# Patient Record
Sex: FEMALE | Race: ASIAN | URBAN - METROPOLITAN AREA
[De-identification: names, ages, dates, MRNs, and addresses within clinical notes are randomized per-mention and may not be internally consistent; named-entity substitution may affect disease eponyms.]

---

## 2017-02-16 ENCOUNTER — EMERGENCY (EMERGENCY)
Facility: HOSPITAL | Age: 42
LOS: 0 days | Discharge: HOME | End: 2017-02-16
Attending: EMERGENCY MEDICINE
Payer: MEDICAID

## 2017-02-16 VITALS
HEIGHT: 57 IN | WEIGHT: 175.93 LBS | OXYGEN SATURATION: 99 % | DIASTOLIC BLOOD PRESSURE: 86 MMHG | HEART RATE: 81 BPM | SYSTOLIC BLOOD PRESSURE: 130 MMHG | RESPIRATION RATE: 20 BRPM | TEMPERATURE: 98 F

## 2017-02-16 DIAGNOSIS — I10 ESSENTIAL (PRIMARY) HYPERTENSION: ICD-10-CM

## 2017-02-16 DIAGNOSIS — M54.9 DORSALGIA, UNSPECIFIED: ICD-10-CM

## 2017-02-16 DIAGNOSIS — N20.0 CALCULUS OF KIDNEY: ICD-10-CM

## 2017-02-16 DIAGNOSIS — Z98.89 OTHER SPECIFIED POSTPROCEDURAL STATES: Chronic | ICD-10-CM

## 2017-02-16 LAB
ALBUMIN SERPL ELPH-MCNC: 3.5 G/DL — SIGNIFICANT CHANGE UP (ref 3.3–5)
ALP SERPL-CCNC: 87 U/L — SIGNIFICANT CHANGE UP (ref 40–120)
ALT FLD-CCNC: 29 U/L — SIGNIFICANT CHANGE UP (ref 12–78)
ANION GAP SERPL CALC-SCNC: 8 MMOL/L — SIGNIFICANT CHANGE UP (ref 5–17)
APPEARANCE UR: CLEAR — SIGNIFICANT CHANGE UP
AST SERPL-CCNC: 24 U/L — SIGNIFICANT CHANGE UP (ref 15–37)
BASOPHILS # BLD AUTO: 0.1 K/UL — SIGNIFICANT CHANGE UP (ref 0–0.2)
BASOPHILS NFR BLD AUTO: 1 % — SIGNIFICANT CHANGE UP (ref 0–2)
BILIRUB SERPL-MCNC: 0.2 MG/DL — SIGNIFICANT CHANGE UP (ref 0.2–1.2)
BILIRUB UR-MCNC: NEGATIVE — SIGNIFICANT CHANGE UP
BUN SERPL-MCNC: 18 MG/DL — SIGNIFICANT CHANGE UP (ref 7–23)
CALCIUM SERPL-MCNC: 8.2 MG/DL — LOW (ref 8.5–10.1)
CHLORIDE SERPL-SCNC: 107 MMOL/L — SIGNIFICANT CHANGE UP (ref 96–108)
CO2 SERPL-SCNC: 26 MMOL/L — SIGNIFICANT CHANGE UP (ref 22–31)
COLOR SPEC: YELLOW — SIGNIFICANT CHANGE UP
CREAT SERPL-MCNC: 0.72 MG/DL — SIGNIFICANT CHANGE UP (ref 0.5–1.3)
DIFF PNL FLD: ABNORMAL
EOSINOPHIL # BLD AUTO: 0.1 K/UL — SIGNIFICANT CHANGE UP (ref 0–0.5)
EOSINOPHIL NFR BLD AUTO: 1.4 % — SIGNIFICANT CHANGE UP (ref 0–6)
EPI CELLS # UR: SIGNIFICANT CHANGE UP
GLUCOSE SERPL-MCNC: 94 MG/DL — SIGNIFICANT CHANGE UP (ref 70–99)
GLUCOSE UR QL: NEGATIVE MG/DL — SIGNIFICANT CHANGE UP
HCG UR QL: NEGATIVE — SIGNIFICANT CHANGE UP
HCT VFR BLD CALC: 39.3 % — SIGNIFICANT CHANGE UP (ref 34.5–45)
HGB BLD-MCNC: 13.2 G/DL — SIGNIFICANT CHANGE UP (ref 11.5–15.5)
KETONES UR-MCNC: NEGATIVE — SIGNIFICANT CHANGE UP
LACTATE SERPL-SCNC: 1.1 MMOL/L — SIGNIFICANT CHANGE UP (ref 0.7–2)
LEUKOCYTE ESTERASE UR-ACNC: ABNORMAL
LYMPHOCYTES # BLD AUTO: 3.5 K/UL — HIGH (ref 1–3.3)
LYMPHOCYTES # BLD AUTO: 40 % — SIGNIFICANT CHANGE UP (ref 13–44)
MCHC RBC-ENTMCNC: 27.7 PG — SIGNIFICANT CHANGE UP (ref 27–34)
MCHC RBC-ENTMCNC: 33.7 GM/DL — SIGNIFICANT CHANGE UP (ref 32–36)
MCV RBC AUTO: 82.2 FL — SIGNIFICANT CHANGE UP (ref 80–100)
MONOCYTES # BLD AUTO: 0.5 K/UL — SIGNIFICANT CHANGE UP (ref 0–0.9)
MONOCYTES NFR BLD AUTO: 5.8 % — SIGNIFICANT CHANGE UP (ref 2–14)
NEUTROPHILS # BLD AUTO: 4.6 K/UL — SIGNIFICANT CHANGE UP (ref 1.8–7.4)
NEUTROPHILS NFR BLD AUTO: 51.8 % — SIGNIFICANT CHANGE UP (ref 43–77)
NITRITE UR-MCNC: NEGATIVE — SIGNIFICANT CHANGE UP
PH UR: 8 — SIGNIFICANT CHANGE UP (ref 4.8–8)
PLATELET # BLD AUTO: 279 K/UL — SIGNIFICANT CHANGE UP (ref 150–400)
POTASSIUM SERPL-MCNC: 4.1 MMOL/L — SIGNIFICANT CHANGE UP (ref 3.5–5.3)
POTASSIUM SERPL-SCNC: 4.1 MMOL/L — SIGNIFICANT CHANGE UP (ref 3.5–5.3)
PROT SERPL-MCNC: 7.7 GM/DL — SIGNIFICANT CHANGE UP (ref 6–8.3)
PROT UR-MCNC: NEGATIVE MG/DL — SIGNIFICANT CHANGE UP
RBC # BLD: 4.78 M/UL — SIGNIFICANT CHANGE UP (ref 3.8–5.2)
RBC # FLD: 12.8 % — SIGNIFICANT CHANGE UP (ref 11–15)
RBC CASTS # UR COMP ASSIST: SIGNIFICANT CHANGE UP /HPF (ref 0–4)
SODIUM SERPL-SCNC: 141 MMOL/L — SIGNIFICANT CHANGE UP (ref 135–145)
SP GR SPEC: 1.01 — SIGNIFICANT CHANGE UP (ref 1.01–1.02)
UROBILINOGEN FLD QL: NEGATIVE MG/DL — SIGNIFICANT CHANGE UP
WBC # BLD: 8.8 K/UL — SIGNIFICANT CHANGE UP (ref 3.8–10.5)
WBC # FLD AUTO: 8.8 K/UL — SIGNIFICANT CHANGE UP (ref 3.8–10.5)
WBC UR QL: SIGNIFICANT CHANGE UP

## 2017-02-16 PROCEDURE — 74176 CT ABD & PELVIS W/O CONTRAST: CPT | Mod: 26

## 2017-02-16 PROCEDURE — 99285 EMERGENCY DEPT VISIT HI MDM: CPT

## 2017-02-16 RX ORDER — CYCLOBENZAPRINE HYDROCHLORIDE 10 MG/1
10 TABLET, FILM COATED ORAL ONCE
Qty: 0 | Refills: 0 | Status: COMPLETED | OUTPATIENT
Start: 2017-02-16 | End: 2017-02-16

## 2017-02-16 RX ORDER — KETOROLAC TROMETHAMINE 30 MG/ML
60 SYRINGE (ML) INJECTION ONCE
Qty: 0 | Refills: 0 | Status: DISCONTINUED | OUTPATIENT
Start: 2017-02-16 | End: 2017-02-16

## 2017-02-16 RX ORDER — CEFTRIAXONE 500 MG/1
2 INJECTION, POWDER, FOR SOLUTION INTRAMUSCULAR; INTRAVENOUS ONCE
Qty: 0 | Refills: 0 | Status: COMPLETED | OUTPATIENT
Start: 2017-02-16 | End: 2017-02-16

## 2017-02-16 RX ORDER — SODIUM CHLORIDE 9 MG/ML
1000 INJECTION INTRAMUSCULAR; INTRAVENOUS; SUBCUTANEOUS ONCE
Qty: 0 | Refills: 0 | Status: COMPLETED | OUTPATIENT
Start: 2017-02-16 | End: 2017-02-16

## 2017-02-16 RX ADMIN — Medication 60 MILLIGRAM(S): at 19:21

## 2017-02-16 RX ADMIN — Medication 60 MILLIGRAM(S): at 21:26

## 2017-02-16 RX ADMIN — SODIUM CHLORIDE 1000 MILLILITER(S): 9 INJECTION INTRAMUSCULAR; INTRAVENOUS; SUBCUTANEOUS at 20:11

## 2017-02-16 RX ADMIN — CEFTRIAXONE 100 GRAM(S): 500 INJECTION, POWDER, FOR SOLUTION INTRAMUSCULAR; INTRAVENOUS at 20:20

## 2017-02-16 RX ADMIN — CYCLOBENZAPRINE HYDROCHLORIDE 10 MILLIGRAM(S): 10 TABLET, FILM COATED ORAL at 19:21

## 2017-02-16 NOTE — ED PROVIDER NOTE - OBJECTIVE STATEMENT
41yo female with pmh HTN, presents with bilateral lower flank and lower back since yesterday making it difficulty to walk. No exacerbating factor. No hematuria, dysuria. No analgesia taken.     No fever/chills. No photophobia/eye pain/changes in vision, No ear pain/sore throat/dysphagia, No chest pain/palpitations. No SOB/cough/stridor. No abdominal pain, N/V/D, no black/bloody bm. No dysuria/frequency, No headache. No Dizziness.  No rash.  No numbness/tingling/weakness.

## 2017-02-16 NOTE — ED PROVIDER NOTE - MEDICAL DECISION MAKING DETAILS
Lab values do not require emergent intervention. UA with no infxn. CT with staghorn calculi. fu with dr. goldsmith. dc with nsaids, flexeril. Discussed results and outcome of testing with the patient.  Patient given copy of available results. Patient advised to please follow up with their PMD within the next 24 hours and return to the Emergency Department for worsening symptoms or any other concerns.

## 2017-02-16 NOTE — ED ADULT NURSE NOTE - OBJECTIVE STATEMENT
received ft c/o back pain since last night denies known injury or recent trauma ambulatory without difficulty noted

## 2017-02-16 NOTE — ED PROVIDER NOTE - PROGRESS NOTE DETAILS
d/w dr. goldsmith, reviewed films, states to dc, will see pt in office. no abx needed unless febrile or wcc.

## 2017-02-17 LAB
CULTURE RESULTS: SIGNIFICANT CHANGE UP
SPECIMEN SOURCE: SIGNIFICANT CHANGE UP

## 2017-02-17 RX ORDER — CYCLOBENZAPRINE HYDROCHLORIDE 10 MG/1
1 TABLET, FILM COATED ORAL
Qty: 10 | Refills: 0 | OUTPATIENT
Start: 2017-02-17

## 2017-02-17 RX ORDER — IBUPROFEN 200 MG
1 TABLET ORAL
Qty: 12 | Refills: 1 | OUTPATIENT
Start: 2017-02-17

## 2017-02-22 LAB
CULTURE RESULTS: SIGNIFICANT CHANGE UP
CULTURE RESULTS: SIGNIFICANT CHANGE UP
SPECIMEN SOURCE: SIGNIFICANT CHANGE UP
SPECIMEN SOURCE: SIGNIFICANT CHANGE UP

## 2018-02-09 ENCOUNTER — EMERGENCY (EMERGENCY)
Facility: HOSPITAL | Age: 43
LOS: 0 days | Discharge: HOME | End: 2018-02-09
Attending: EMERGENCY MEDICINE
Payer: SELF-PAY

## 2018-02-09 VITALS
SYSTOLIC BLOOD PRESSURE: 143 MMHG | WEIGHT: 179.9 LBS | DIASTOLIC BLOOD PRESSURE: 94 MMHG | RESPIRATION RATE: 19 BRPM | TEMPERATURE: 98 F | HEIGHT: 57 IN | OXYGEN SATURATION: 97 % | HEART RATE: 91 BPM

## 2018-02-09 VITALS
DIASTOLIC BLOOD PRESSURE: 90 MMHG | SYSTOLIC BLOOD PRESSURE: 142 MMHG | TEMPERATURE: 98 F | OXYGEN SATURATION: 96 % | HEART RATE: 92 BPM | RESPIRATION RATE: 20 BRPM

## 2018-02-09 DIAGNOSIS — K29.70 GASTRITIS, UNSPECIFIED, WITHOUT BLEEDING: ICD-10-CM

## 2018-02-09 DIAGNOSIS — K21.9 GASTRO-ESOPHAGEAL REFLUX DISEASE WITHOUT ESOPHAGITIS: ICD-10-CM

## 2018-02-09 DIAGNOSIS — R07.9 CHEST PAIN, UNSPECIFIED: ICD-10-CM

## 2018-02-09 DIAGNOSIS — Z98.89 OTHER SPECIFIED POSTPROCEDURAL STATES: Chronic | ICD-10-CM

## 2018-02-09 DIAGNOSIS — R07.81 PLEURODYNIA: ICD-10-CM

## 2018-02-09 DIAGNOSIS — R10.13 EPIGASTRIC PAIN: ICD-10-CM

## 2018-02-09 DIAGNOSIS — Z79.1 LONG TERM (CURRENT) USE OF NON-STEROIDAL ANTI-INFLAMMATORIES (NSAID): ICD-10-CM

## 2018-02-09 LAB
ALBUMIN SERPL ELPH-MCNC: 3.6 G/DL — SIGNIFICANT CHANGE UP (ref 3.3–5)
ALP SERPL-CCNC: 89 U/L — SIGNIFICANT CHANGE UP (ref 40–120)
ALT FLD-CCNC: 47 U/L — SIGNIFICANT CHANGE UP (ref 12–78)
ANION GAP SERPL CALC-SCNC: 6 MMOL/L — SIGNIFICANT CHANGE UP (ref 5–17)
APTT BLD: 29.1 SEC — SIGNIFICANT CHANGE UP (ref 27.5–37.4)
AST SERPL-CCNC: 47 U/L — HIGH (ref 15–37)
BASOPHILS # BLD AUTO: 0.04 K/UL — SIGNIFICANT CHANGE UP (ref 0–0.2)
BASOPHILS NFR BLD AUTO: 0.4 % — SIGNIFICANT CHANGE UP (ref 0–2)
BILIRUB SERPL-MCNC: 0.5 MG/DL — SIGNIFICANT CHANGE UP (ref 0.2–1.2)
BUN SERPL-MCNC: 11 MG/DL — SIGNIFICANT CHANGE UP (ref 7–23)
CALCIUM SERPL-MCNC: 8 MG/DL — LOW (ref 8.5–10.1)
CHLORIDE SERPL-SCNC: 107 MMOL/L — SIGNIFICANT CHANGE UP (ref 96–108)
CK MB BLD-MCNC: 1.2 % — SIGNIFICANT CHANGE UP (ref 0–3.5)
CK MB CFR SERPL CALC: 2.8 NG/ML — SIGNIFICANT CHANGE UP (ref 0.5–3.6)
CK SERPL-CCNC: 243 U/L — HIGH (ref 26–192)
CO2 SERPL-SCNC: 27 MMOL/L — SIGNIFICANT CHANGE UP (ref 22–31)
CREAT SERPL-MCNC: 0.76 MG/DL — SIGNIFICANT CHANGE UP (ref 0.5–1.3)
D DIMER BLD IA.RAPID-MCNC: 221 NG/ML DDU — SIGNIFICANT CHANGE UP
EOSINOPHIL # BLD AUTO: 0.25 K/UL — SIGNIFICANT CHANGE UP (ref 0–0.5)
EOSINOPHIL NFR BLD AUTO: 2.5 % — SIGNIFICANT CHANGE UP (ref 0–6)
GLUCOSE SERPL-MCNC: 97 MG/DL — SIGNIFICANT CHANGE UP (ref 70–99)
HCG SERPL-ACNC: <1 MIU/ML — SIGNIFICANT CHANGE UP
HCT VFR BLD CALC: 38.4 % — SIGNIFICANT CHANGE UP (ref 34.5–45)
HGB BLD-MCNC: 12.3 G/DL — SIGNIFICANT CHANGE UP (ref 11.5–15.5)
IMM GRANULOCYTES NFR BLD AUTO: 0.2 % — SIGNIFICANT CHANGE UP (ref 0–1.5)
INR BLD: 1.04 RATIO — SIGNIFICANT CHANGE UP (ref 0.88–1.16)
LIDOCAIN IGE QN: 158 U/L — SIGNIFICANT CHANGE UP (ref 73–393)
LYMPHOCYTES # BLD AUTO: 2.31 K/UL — SIGNIFICANT CHANGE UP (ref 1–3.3)
LYMPHOCYTES # BLD AUTO: 23.2 % — SIGNIFICANT CHANGE UP (ref 13–44)
MCHC RBC-ENTMCNC: 26.1 PG — LOW (ref 27–34)
MCHC RBC-ENTMCNC: 32 GM/DL — SIGNIFICANT CHANGE UP (ref 32–36)
MCV RBC AUTO: 81.5 FL — SIGNIFICANT CHANGE UP (ref 80–100)
MONOCYTES # BLD AUTO: 0.66 K/UL — SIGNIFICANT CHANGE UP (ref 0–0.9)
MONOCYTES NFR BLD AUTO: 6.6 % — SIGNIFICANT CHANGE UP (ref 2–14)
NEUTROPHILS # BLD AUTO: 6.68 K/UL — SIGNIFICANT CHANGE UP (ref 1.8–7.4)
NEUTROPHILS NFR BLD AUTO: 67.1 % — SIGNIFICANT CHANGE UP (ref 43–77)
NRBC # BLD: 0 /100 WBCS — SIGNIFICANT CHANGE UP (ref 0–0)
NT-PROBNP SERPL-SCNC: 19 PG/ML — SIGNIFICANT CHANGE UP (ref 0–125)
PLATELET # BLD AUTO: 266 K/UL — SIGNIFICANT CHANGE UP (ref 150–400)
POTASSIUM SERPL-MCNC: 4.1 MMOL/L — SIGNIFICANT CHANGE UP (ref 3.5–5.3)
POTASSIUM SERPL-SCNC: 4.1 MMOL/L — SIGNIFICANT CHANGE UP (ref 3.5–5.3)
PROT SERPL-MCNC: 7.7 GM/DL — SIGNIFICANT CHANGE UP (ref 6–8.3)
PROTHROM AB SERPL-ACNC: 11.4 SEC — SIGNIFICANT CHANGE UP (ref 9.8–12.7)
RBC # BLD: 4.71 M/UL — SIGNIFICANT CHANGE UP (ref 3.8–5.2)
RBC # FLD: 13.8 % — SIGNIFICANT CHANGE UP (ref 10.3–14.5)
SODIUM SERPL-SCNC: 140 MMOL/L — SIGNIFICANT CHANGE UP (ref 135–145)
TROPONIN I SERPL-MCNC: <.015 NG/ML — SIGNIFICANT CHANGE UP (ref 0.01–0.04)
WBC # BLD: 9.96 K/UL — SIGNIFICANT CHANGE UP (ref 3.8–10.5)
WBC # FLD AUTO: 9.96 K/UL — SIGNIFICANT CHANGE UP (ref 3.8–10.5)

## 2018-02-09 PROCEDURE — 71275 CT ANGIOGRAPHY CHEST: CPT | Mod: 26

## 2018-02-09 PROCEDURE — 71045 X-RAY EXAM CHEST 1 VIEW: CPT | Mod: 26

## 2018-02-09 PROCEDURE — 99285 EMERGENCY DEPT VISIT HI MDM: CPT

## 2018-02-09 RX ORDER — SODIUM CHLORIDE 9 MG/ML
3 INJECTION INTRAMUSCULAR; INTRAVENOUS; SUBCUTANEOUS ONCE
Qty: 0 | Refills: 0 | Status: COMPLETED | OUTPATIENT
Start: 2018-02-09 | End: 2018-02-09

## 2018-02-09 RX ORDER — FAMOTIDINE 10 MG/ML
1 INJECTION INTRAVENOUS
Qty: 60 | Refills: 0 | OUTPATIENT
Start: 2018-02-09 | End: 2018-03-10

## 2018-02-09 RX ORDER — FAMOTIDINE 10 MG/ML
20 INJECTION INTRAVENOUS ONCE
Qty: 0 | Refills: 0 | Status: COMPLETED | OUTPATIENT
Start: 2018-02-09 | End: 2018-02-09

## 2018-02-09 RX ORDER — ASPIRIN/CALCIUM CARB/MAGNESIUM 324 MG
325 TABLET ORAL ONCE
Qty: 0 | Refills: 0 | Status: COMPLETED | OUTPATIENT
Start: 2018-02-09 | End: 2018-02-09

## 2018-02-09 RX ADMIN — SODIUM CHLORIDE 3 MILLILITER(S): 9 INJECTION INTRAMUSCULAR; INTRAVENOUS; SUBCUTANEOUS at 09:42

## 2018-02-09 RX ADMIN — Medication 325 MILLIGRAM(S): at 10:53

## 2018-02-09 RX ADMIN — FAMOTIDINE 20 MILLIGRAM(S): 10 INJECTION INTRAVENOUS at 09:43

## 2018-02-09 RX ADMIN — Medication 30 MILLILITER(S): at 09:43

## 2018-02-09 NOTE — ED ADULT NURSE REASSESSMENT NOTE - NS ED NURSE REASSESS COMMENT FT1
pt seen and re-evalauted by ED attending d/c ready in stable condition left ED ambulatory in no acute distress. instructed to f/u with PMD for f/u care and take meds as prescribed

## 2018-02-09 NOTE — ED ADULT NURSE NOTE - MODE OF DISCHARGE
no fever/no stiffness/no bruising/no weakness/no back pain/no abrasion/no numbness/no difficulty bearing weight/no deformity/no tingling
Ambulatory

## 2018-02-09 NOTE — ED PROVIDER NOTE - OBJECTIVE STATEMENT
44yo female with pmh HTN presents with 1 day of constant epigastric pain radiating throughout chest and back with Left pleuritc pain. no sob, palpitations. Denies recent immobilization, surgery, long trips or plane rides, hormones/OCP, leg pain or swelling or family or personal history of blood clotting disorder. nonsmoker, no drug use, no famh of cardiac dz. 42yo female with pmh HTN presents with 1 day of constant epigastric pain radiating throughout chest and back with Left pleuritc pain. no sob, palpitations. Denies recent immobilization, surgery, long trips or plane rides, hormones/OCP, leg pain or swelling or family or personal history of blood clotting disorder. nonsmoker, no drug use, no famh of cardiac dz.     ROS: No fever/chills. No photophobia/eye pain/changes in vision, No ear pain/sore throat/dysphagia, + chest pain, no palpitations. No SOB/cough/stridor. +abdominal pain, no N/V/D, no black/bloody bm. No dysuria/frequency/discharge, No headache. No Dizziness.  No rash.  No numbness/tingling/weakness.

## 2018-02-09 NOTE — ED PROVIDER NOTE - MEDICAL DECISION MAKING DETAILS
Pt well appearing, symptoms resolved with GI cocktail. LIkely gastrititis, dc with pepcid and fu with pmd. Discussed results and outcome of testing with the patient.  Patient given copy of available results. Patient advised to please follow up with their PMD within the next 24 hours and return to the Emergency Department for worsening symptoms or any other concerns.

## 2018-02-09 NOTE — ED ADULT TRIAGE NOTE - CHIEF COMPLAINT QUOTE
patient c/o of chest pain radiating in the back and R side , started 1 day ago , denied N/V denied dizziness no weakness c/o of headache , patient denied cough , no fever

## 2018-02-09 NOTE — ED PROVIDER NOTE - PHYSICAL EXAMINATION
Gen: Alert, Well appearing. NAD    Head: NC, AT, PERRL, EOMI, normal lids/conjunctiva   ENT: Bilateral TM WNL, normal hearing, patent oropharynx without erythema/exudate, uvula midline  Neck: supple, no tenderness/meningismus/JVD   Pulm: Bilateral clear BS, normal resp effort, no wheeze/stridor/retractions  CV: RRR, no M/R/G, +dist pulses   Abd: soft, NT/ND, +BS, no guarding/rebound tenderness  Mskel: no edema/erythema/cyanosis   Skin: no rash   Neuro: AAOx3, no sensory/motor deficits, Gen: Alert, Well appearing. NAD    Head: NC, AT, PERRL, EOMI, normal lids/conjunctiva   ENT: Bilateral TM WNL, normal hearing, patent oropharynx without erythema/exudate, uvula midline  Neck: supple, no tenderness/meningismus/JVD   Pulm: Bilateral clear BS, normal resp effort, no wheeze/stridor/retractions  CV: RRR, no M/R/G, +dist pulses   Abd: soft, ++ epigastric tenderness, no D, +BS, no guarding/rebound tenderness  Mskel: no edema/erythema/cyanosis   Skin: no rash   Neuro: AAOx3, no sensory/motor deficits,

## 2020-11-17 ENCOUNTER — APPOINTMENT (OUTPATIENT)
Dept: INTERNAL MEDICINE | Facility: CLINIC | Age: 45
End: 2020-11-17
Payer: COMMERCIAL

## 2020-11-17 VITALS
OXYGEN SATURATION: 98 % | DIASTOLIC BLOOD PRESSURE: 88 MMHG | WEIGHT: 166 LBS | BODY MASS INDEX: 32.17 KG/M2 | SYSTOLIC BLOOD PRESSURE: 130 MMHG | TEMPERATURE: 97.4 F | HEIGHT: 60.05 IN | HEART RATE: 94 BPM

## 2020-11-17 VITALS — DIASTOLIC BLOOD PRESSURE: 90 MMHG | SYSTOLIC BLOOD PRESSURE: 146 MMHG

## 2020-11-17 DIAGNOSIS — Z81.1 FAMILY HISTORY OF ALCOHOL ABUSE AND DEPENDENCE: ICD-10-CM

## 2020-11-17 DIAGNOSIS — G47.00 INSOMNIA, UNSPECIFIED: ICD-10-CM

## 2020-11-17 DIAGNOSIS — Z87.42 PERSONAL HISTORY OF OTHER DISEASES OF THE FEMALE GENITAL TRACT: ICD-10-CM

## 2020-11-17 DIAGNOSIS — Z00.00 ENCOUNTER FOR GENERAL ADULT MEDICAL EXAMINATION W/OUT ABNORMAL FINDINGS: ICD-10-CM

## 2020-11-17 DIAGNOSIS — E28.2 POLYCYSTIC OVARIAN SYNDROME: ICD-10-CM

## 2020-11-17 DIAGNOSIS — R25.1 TREMOR, UNSPECIFIED: ICD-10-CM

## 2020-11-17 DIAGNOSIS — K21.9 GASTRO-ESOPHAGEAL REFLUX DISEASE W/OUT ESOPHAGITIS: ICD-10-CM

## 2020-11-17 DIAGNOSIS — R07.89 OTHER CHEST PAIN: ICD-10-CM

## 2020-11-17 DIAGNOSIS — M25.561 PAIN IN RIGHT KNEE: ICD-10-CM

## 2020-11-17 DIAGNOSIS — R06.00 DYSPNEA, UNSPECIFIED: ICD-10-CM

## 2020-11-17 PROCEDURE — 99203 OFFICE O/P NEW LOW 30 MIN: CPT | Mod: 25

## 2020-11-17 PROCEDURE — 99072 ADDL SUPL MATRL&STAF TM PHE: CPT

## 2020-11-17 PROCEDURE — 36415 COLL VENOUS BLD VENIPUNCTURE: CPT

## 2020-11-17 NOTE — HEALTH RISK ASSESSMENT
[# Of Children ___] : has [unfilled] children [No] : No [0] : 2) Feeling down, depressed, or hopeless: Not at all (0) [FreeTextEntry2] :  at [employer] [FreeTextEntry3] :  [] : No [[id number]] : 0

## 2020-11-17 NOTE — HISTORY OF PRESENT ILLNESS
[FreeTextEntry8] : 44 y/o female with h/o hypertension who is here to establish care. She just moved back to [place] from [place] 4 months ago because her daughter is pregnant. She ronnell lisinopril/HCTZ 10/12.5. Some days she forgets to take her medication in the morning. She doesn’t take it later in the day if she forgets. She works at [employer]. she checks her BP there about once per month but shes not sure what kinds of reading she gets.  Also has h/o borderline DM. has lost weight with improved diet, exercise \par She c/o dry skin since she returned to [place]\par She c/o right knee pain for several months. Hurts when she kneels on it\par She has difficulty falling asleep. She has been taking 2 benadryl every night for years. She is not groggy in the morning. She takes it at 7pm as it takes several hours to work. \par pap overdue\par mammo overdue\par colonoscopy 2019

## 2020-11-17 NOTE — PLAN
[FreeTextEntry1] : BP is elevated. Will have her f/u in 1 month to recheck. If still elevated will adjust medication\par Check CBC and CMP\par Continue exercise and healthy diet\par Recommended flu shot and Tdap  given that her daughter is pregnant. She declines all vaccinations. \par Referral to GYN  and dermatology\par Xray of right knee\par Given Mammogram Rx\par

## 2020-11-17 NOTE — PHYSICAL EXAM
[No Acute Distress] : no acute distress [Well Nourished] : well nourished [Normal Sclera/Conjunctiva] : normal sclera/conjunctiva [Normal Outer Ear/Nose] : the outer ears and nose were normal in appearance [Supple] : supple [No Respiratory Distress] : no respiratory distress  [No Accessory Muscle Use] : no accessory muscle use [Clear to Auscultation] : lungs were clear to auscultation bilaterally [Normal Rate] : normal rate  [Regular Rhythm] : with a regular rhythm [Normal S1, S2] : normal S1 and S2 [No Edema] : there was no peripheral edema [Soft] : abdomen soft [Non Tender] : non-tender [Non-distended] : non-distended [Normal Bowel Sounds] : normal bowel sounds [Normal Gait] : normal gait [Normal Affect] : the affect was normal [Normal Insight/Judgement] : insight and judgment were intact [de-identified] : dry skin

## 2020-11-24 LAB
ALBUMIN SERPL ELPH-MCNC: 4.6 G/DL
ALP BLD-CCNC: 87 U/L
ALT SERPL-CCNC: 15 U/L
ANION GAP SERPL CALC-SCNC: 12 MMOL/L
AST SERPL-CCNC: 20 U/L
BASOPHILS # BLD AUTO: 0.04 K/UL
BASOPHILS NFR BLD AUTO: 0.6 %
BILIRUB SERPL-MCNC: 0.2 MG/DL
BUN SERPL-MCNC: 14 MG/DL
CALCIUM SERPL-MCNC: 9.2 MG/DL
CHLORIDE SERPL-SCNC: 101 MMOL/L
CO2 SERPL-SCNC: 25 MMOL/L
CREAT SERPL-MCNC: 1.06 MG/DL
EOSINOPHIL # BLD AUTO: 0.1 K/UL
EOSINOPHIL NFR BLD AUTO: 1.5 %
ESTIMATED AVERAGE GLUCOSE: 123 MG/DL
GLUCOSE SERPL-MCNC: 104 MG/DL
HBA1C MFR BLD HPLC: 5.9 %
HCT VFR BLD CALC: 43.7 %
HGB BLD-MCNC: 13.5 G/DL
IMM GRANULOCYTES NFR BLD AUTO: 0.5 %
LYMPHOCYTES # BLD AUTO: 2.01 K/UL
LYMPHOCYTES NFR BLD AUTO: 31.1 %
MAN DIFF?: NORMAL
MCHC RBC-ENTMCNC: 28.4 PG
MCHC RBC-ENTMCNC: 30.9 GM/DL
MCV RBC AUTO: 91.8 FL
MONOCYTES # BLD AUTO: 0.41 K/UL
MONOCYTES NFR BLD AUTO: 6.3 %
NEUTROPHILS # BLD AUTO: 3.88 K/UL
NEUTROPHILS NFR BLD AUTO: 60 %
PLATELET # BLD AUTO: 303 K/UL
POTASSIUM SERPL-SCNC: 4.6 MMOL/L
PROT SERPL-MCNC: 7.6 G/DL
RBC # BLD: 4.76 M/UL
RBC # FLD: 13.1 %
SODIUM SERPL-SCNC: 138 MMOL/L
WBC # FLD AUTO: 6.47 K/UL

## 2021-01-05 ENCOUNTER — APPOINTMENT (OUTPATIENT)
Dept: INTERNAL MEDICINE | Facility: CLINIC | Age: 46
End: 2021-01-05

## 2021-01-06 NOTE — ED ADULT TRIAGE NOTE - NS ED NOTE AC HIGH RISK COUNTRIES
Dr. Charlette Noe notified about patient BP being 80s/40s. Orders for stat bolus of 25g of albumin. No

## 2021-06-17 ENCOUNTER — APPOINTMENT (OUTPATIENT)
Dept: INTERNAL MEDICINE | Facility: CLINIC | Age: 46
End: 2021-06-17
Payer: COMMERCIAL

## 2021-06-17 VITALS
HEIGHT: 60 IN | TEMPERATURE: 97.5 F | WEIGHT: 167 LBS | DIASTOLIC BLOOD PRESSURE: 100 MMHG | BODY MASS INDEX: 32.79 KG/M2 | HEART RATE: 121 BPM | SYSTOLIC BLOOD PRESSURE: 120 MMHG | OXYGEN SATURATION: 95 %

## 2021-06-17 VITALS — DIASTOLIC BLOOD PRESSURE: 82 MMHG | SYSTOLIC BLOOD PRESSURE: 130 MMHG

## 2021-06-17 DIAGNOSIS — I10 ESSENTIAL (PRIMARY) HYPERTENSION: ICD-10-CM

## 2021-06-17 DIAGNOSIS — R73.03 PREDIABETES.: ICD-10-CM

## 2021-06-17 DIAGNOSIS — Z12.31 ENCOUNTER FOR SCREENING MAMMOGRAM FOR MALIGNANT NEOPLASM OF BREAST: ICD-10-CM

## 2021-06-17 PROCEDURE — 99214 OFFICE O/P EST MOD 30 MIN: CPT | Mod: 25

## 2021-06-17 PROCEDURE — 99072 ADDL SUPL MATRL&STAF TM PHE: CPT

## 2021-06-17 PROCEDURE — 36415 COLL VENOUS BLD VENIPUNCTURE: CPT

## 2021-06-17 NOTE — PLAN
[FreeTextEntry1] : Depression screening negative \par BP is ok on lisinopril/HCTZ\par Continue to work on improving diet and exercising\par check CMP and HbA1c\par Rx for mammogram\par schedule pap\par f/u 6 months

## 2021-06-17 NOTE — HISTORY OF PRESENT ILLNESS
[FreeTextEntry1] : f/u BP and blood sugar [de-identified] : 46-year-old female with history of hypertension and borderline diabetes who is here for follow-up.\par She is now exercising 3 times per week.\par Her diet is better. stopped bread and rice\par No polyuria polydipsia.  No headaches, chest pain or palpitations\par pap not in a long time\par mammo not in a long time

## 2021-06-21 LAB
ALBUMIN SERPL ELPH-MCNC: 4.3 G/DL
ALP BLD-CCNC: 77 U/L
ALT SERPL-CCNC: 21 U/L
ANION GAP SERPL CALC-SCNC: 13 MMOL/L
AST SERPL-CCNC: 25 U/L
BILIRUB SERPL-MCNC: 0.2 MG/DL
BUN SERPL-MCNC: 17 MG/DL
CALCIUM SERPL-MCNC: 9.5 MG/DL
CHLORIDE SERPL-SCNC: 102 MMOL/L
CO2 SERPL-SCNC: 24 MMOL/L
CREAT SERPL-MCNC: 0.81 MG/DL
ESTIMATED AVERAGE GLUCOSE: 126 MG/DL
GLUCOSE SERPL-MCNC: 83 MG/DL
HBA1C MFR BLD HPLC: 6 %
POTASSIUM SERPL-SCNC: 4.4 MMOL/L
PROT SERPL-MCNC: 7.6 G/DL
SODIUM SERPL-SCNC: 139 MMOL/L

## 2021-06-22 ENCOUNTER — NON-APPOINTMENT (OUTPATIENT)
Age: 46
End: 2021-06-22

## 2021-10-08 RX ORDER — LISINOPRIL AND HYDROCHLOROTHIAZIDE TABLETS 10; 12.5 MG/1; MG/1
10-12.5 TABLET ORAL
Qty: 90 | Refills: 0 | Status: ACTIVE | COMMUNITY
Start: 1900-01-01 | End: 1900-01-01

## 2023-12-17 ENCOUNTER — NON-APPOINTMENT (OUTPATIENT)
Age: 48
End: 2023-12-17

## 2024-06-05 ENCOUNTER — APPOINTMENT (OUTPATIENT)
Dept: GASTROENTEROLOGY | Facility: CLINIC | Age: 49
End: 2024-06-05
Payer: COMMERCIAL

## 2024-06-05 VITALS
HEIGHT: 60 IN | SYSTOLIC BLOOD PRESSURE: 142 MMHG | WEIGHT: 160 LBS | BODY MASS INDEX: 31.41 KG/M2 | DIASTOLIC BLOOD PRESSURE: 86 MMHG

## 2024-06-05 DIAGNOSIS — Z86.010 PERSONAL HISTORY OF COLONIC POLYPS: ICD-10-CM

## 2024-06-05 DIAGNOSIS — K92.1 MELENA: ICD-10-CM

## 2024-06-05 DIAGNOSIS — K59.00 CONSTIPATION, UNSPECIFIED: ICD-10-CM

## 2024-06-05 DIAGNOSIS — K62.89 OTHER SPECIFIED DISEASES OF ANUS AND RECTUM: ICD-10-CM

## 2024-06-05 DIAGNOSIS — K62.5 HEMORRHAGE OF ANUS AND RECTUM: ICD-10-CM

## 2024-06-05 PROCEDURE — 99204 OFFICE O/P NEW MOD 45 MIN: CPT

## 2024-06-05 RX ORDER — SODIUM SULFATE, POTASSIUM SULFATE AND MAGNESIUM SULFATE 1.6; 3.13; 17.5 G/177ML; G/177ML; G/177ML
17.5-3.13-1.6 SOLUTION ORAL
Qty: 2 | Refills: 0 | Status: ACTIVE | COMMUNITY
Start: 2024-06-05 | End: 1900-01-01

## 2024-06-05 RX ORDER — LUBIPROSTONE 24 UG/1
24 CAPSULE ORAL TWICE DAILY
Qty: 180 | Refills: 2 | Status: ACTIVE | COMMUNITY
Start: 2024-06-05 | End: 1900-01-01

## 2024-06-05 NOTE — PHYSICAL EXAM
[Hearing Threshold Finger Rub Not [place]] : hearing was normal [Normal Appearance] : the appearance of the neck was normal [Normal] : heart rate was normal and rhythm regular, normal S1 and S2, no murmurs [None] : no edema [Bowel Sounds] : normal bowel sounds [Abdomen Tenderness] : non-tender [No Masses] : no abdominal mass palpated [Abdomen Soft] : soft [Oriented To Time, Place, And Person] : oriented to person, place, and time

## 2024-06-06 NOTE — CONSULT LETTER
[Dear  ___] : Dear  [unfilled], [Consult Letter:] : I had the pleasure of evaluating your patient, [unfilled]. [Please see my note below.] : Please see my note below. [Consult Closing:] : Thank you very much for allowing me to participate in the care of this patient.  If you have any questions, please do not hesitate to contact me. [Sincerely,] : Sincerely, [FreeTextEntry3] : Dr. Carla Landeros

## 2024-06-06 NOTE — ASSESSMENT
[FreeTextEntry1] : Pleasant 49-year-old woman with a Hx of JEAN (on CPAP), insomnia, depression/anxiety, HTN, carpal tunnel, back pain, colon polyps, and BRBPR presents with BRBPR ("a lot" per pt, since 8 years ago; no anemia on labs done ~ 1 year ago). Pt also reports constipation with abdominal bloating and lower abdominal pain. Pt is on an inhaler.  Recommend that the pt avoid NSAIDs (take Tylenol instead), start Amitiza 24 mcg bid for constipation (take with breakfast and dinner), and schedule a colonoscopy.   The patient will proceed with a colonoscopy. I explained to the patient the risks, alternatives and benefits to a colonoscopy. Risk including but not limited to bleeding, perforation, infection adverse medication reaction. Questions were answered. agreeable to proceed with the planned procedures.   The patient will hold NSAIDs and vitamins/supplements for 5 days prior. Otherwise continue medications as prescribed. The patient is not on diabetes medications or anticoagulation.   Patient seen and examined, overseeing documentation by Tata JOY Will proceed with a colonoscopy. Medications as usual. Reviewed and reconciled medications, allergies, PMHx, PSHx, SocHx, FMHx. Reviewed imaging, blood work, diagnostic testing, discussed with patient. Further recommendations pending results of above work-up and evaluation.  All questions answered Call with any questions or concerns Time spent before and after visit reviewing patient's chart

## 2024-06-06 NOTE — REASON FOR VISIT
[Initial Evaluation] : an initial evaluation [FreeTextEntry1] : BRBPR, constipation, abdominal bloating, lower abdominal pain

## 2024-06-11 ENCOUNTER — RESULT REVIEW (OUTPATIENT)
Age: 49
End: 2024-06-11

## 2024-06-11 ENCOUNTER — APPOINTMENT (OUTPATIENT)
Dept: GASTROENTEROLOGY | Facility: AMBULATORY MEDICAL SERVICES | Age: 49
End: 2024-06-11
Payer: MEDICAID

## 2024-06-11 PROCEDURE — 45380 COLONOSCOPY AND BIOPSY: CPT

## 2024-06-21 ENCOUNTER — INPATIENT (INPATIENT)
Facility: HOSPITAL | Age: 49
LOS: 16 days | Discharge: HOME | DRG: 754 | End: 2024-07-08
Attending: PSYCHIATRY & NEUROLOGY | Admitting: PSYCHIATRY & NEUROLOGY
Payer: MEDICAID

## 2024-06-21 VITALS
WEIGHT: 168.21 LBS | DIASTOLIC BLOOD PRESSURE: 100 MMHG | OXYGEN SATURATION: 100 % | SYSTOLIC BLOOD PRESSURE: 148 MMHG | TEMPERATURE: 98 F | RESPIRATION RATE: 19 BRPM | HEART RATE: 115 BPM

## 2024-06-21 DIAGNOSIS — F41.9 ANXIETY DISORDER, UNSPECIFIED: ICD-10-CM

## 2024-06-21 DIAGNOSIS — Z98.89 UNDEFINED: Chronic | ICD-10-CM

## 2024-06-21 DIAGNOSIS — R45.851 SUICIDAL IDEATIONS: ICD-10-CM

## 2024-06-21 LAB
ALBUMIN SERPL ELPH-MCNC: 3.2 G/DL — LOW (ref 3.3–5)
ALP SERPL-CCNC: 77 U/L — SIGNIFICANT CHANGE UP (ref 40–120)
ALT FLD-CCNC: 24 U/L — SIGNIFICANT CHANGE UP (ref 12–78)
AMPHET UR-MCNC: NEGATIVE — SIGNIFICANT CHANGE UP
ANION GAP SERPL CALC-SCNC: 5 MMOL/L — SIGNIFICANT CHANGE UP (ref 5–17)
APAP SERPL-MCNC: < 2 UG/ML (ref 10–30)
APPEARANCE UR: CLEAR — SIGNIFICANT CHANGE UP
AST SERPL-CCNC: 21 U/L — SIGNIFICANT CHANGE UP (ref 15–37)
BACTERIA # UR AUTO: ABNORMAL /HPF
BARBITURATES UR SCN-MCNC: NEGATIVE — SIGNIFICANT CHANGE UP
BASOPHILS # BLD AUTO: 0.05 K/UL — SIGNIFICANT CHANGE UP (ref 0–0.2)
BASOPHILS NFR BLD AUTO: 0.6 % — SIGNIFICANT CHANGE UP (ref 0–2)
BENZODIAZ UR-MCNC: POSITIVE — SIGNIFICANT CHANGE UP
BILIRUB SERPL-MCNC: 0.2 MG/DL — SIGNIFICANT CHANGE UP (ref 0.2–1.2)
BILIRUB UR-MCNC: NEGATIVE — SIGNIFICANT CHANGE UP
BUN SERPL-MCNC: 15 MG/DL — SIGNIFICANT CHANGE UP (ref 7–23)
CALCIUM SERPL-MCNC: 8.3 MG/DL — LOW (ref 8.5–10.1)
CAST: 0 /LPF — SIGNIFICANT CHANGE UP (ref 0–4)
CHLORIDE SERPL-SCNC: 108 MMOL/L — SIGNIFICANT CHANGE UP (ref 96–108)
CO2 SERPL-SCNC: 24 MMOL/L — SIGNIFICANT CHANGE UP (ref 22–31)
COCAINE METAB.OTHER UR-MCNC: NEGATIVE — SIGNIFICANT CHANGE UP
COLOR SPEC: YELLOW — SIGNIFICANT CHANGE UP
CREAT SERPL-MCNC: 1.05 MG/DL — SIGNIFICANT CHANGE UP (ref 0.5–1.3)
DIFF PNL FLD: NEGATIVE — SIGNIFICANT CHANGE UP
EGFR: 66 ML/MIN/1.73M2 — SIGNIFICANT CHANGE UP
EOSINOPHIL # BLD AUTO: 0.09 K/UL — SIGNIFICANT CHANGE UP (ref 0–0.5)
EOSINOPHIL NFR BLD AUTO: 1.1 % — SIGNIFICANT CHANGE UP (ref 0–6)
ETHANOL SERPL-MCNC: <10 MG/DL — SIGNIFICANT CHANGE UP (ref 0–10)
FENTANYL UR QL SCN: NEGATIVE — SIGNIFICANT CHANGE UP
GLUCOSE SERPL-MCNC: 148 MG/DL — HIGH (ref 70–99)
GLUCOSE UR QL: NEGATIVE MG/DL — SIGNIFICANT CHANGE UP
HCG SERPL-ACNC: <1 MIU/ML — SIGNIFICANT CHANGE UP
HCT VFR BLD CALC: 37.7 % — SIGNIFICANT CHANGE UP (ref 34.5–45)
HGB BLD-MCNC: 12.5 G/DL — SIGNIFICANT CHANGE UP (ref 11.5–15.5)
IMM GRANULOCYTES NFR BLD AUTO: 0.1 % — SIGNIFICANT CHANGE UP (ref 0–0.9)
KETONES UR-MCNC: NEGATIVE MG/DL — SIGNIFICANT CHANGE UP
LEUKOCYTE ESTERASE UR-ACNC: ABNORMAL
LYMPHOCYTES # BLD AUTO: 2.29 K/UL — SIGNIFICANT CHANGE UP (ref 1–3.3)
LYMPHOCYTES # BLD AUTO: 27.6 % — SIGNIFICANT CHANGE UP (ref 13–44)
MCHC RBC-ENTMCNC: 28.4 PG — SIGNIFICANT CHANGE UP (ref 27–34)
MCHC RBC-ENTMCNC: 33.2 GM/DL — SIGNIFICANT CHANGE UP (ref 32–36)
MCV RBC AUTO: 85.7 FL — SIGNIFICANT CHANGE UP (ref 80–100)
METHADONE UR-MCNC: NEGATIVE — SIGNIFICANT CHANGE UP
MONOCYTES # BLD AUTO: 0.56 K/UL — SIGNIFICANT CHANGE UP (ref 0–0.9)
MONOCYTES NFR BLD AUTO: 6.7 % — SIGNIFICANT CHANGE UP (ref 2–14)
NEUTROPHILS # BLD AUTO: 5.3 K/UL — SIGNIFICANT CHANGE UP (ref 1.8–7.4)
NEUTROPHILS NFR BLD AUTO: 63.9 % — SIGNIFICANT CHANGE UP (ref 43–77)
NITRITE UR-MCNC: NEGATIVE — SIGNIFICANT CHANGE UP
OPIATES UR-MCNC: NEGATIVE — SIGNIFICANT CHANGE UP
PCP SPEC-MCNC: SIGNIFICANT CHANGE UP
PCP UR-MCNC: NEGATIVE — SIGNIFICANT CHANGE UP
PH UR: 6 — SIGNIFICANT CHANGE UP (ref 5–8)
PLATELET # BLD AUTO: 272 K/UL — SIGNIFICANT CHANGE UP (ref 150–400)
POTASSIUM SERPL-MCNC: 3.6 MMOL/L — SIGNIFICANT CHANGE UP (ref 3.5–5.3)
POTASSIUM SERPL-SCNC: 3.6 MMOL/L — SIGNIFICANT CHANGE UP (ref 3.5–5.3)
PROT SERPL-MCNC: 7.4 GM/DL — SIGNIFICANT CHANGE UP (ref 6–8.3)
PROT UR-MCNC: NEGATIVE MG/DL — SIGNIFICANT CHANGE UP
RBC # BLD: 4.4 M/UL — SIGNIFICANT CHANGE UP (ref 3.8–5.2)
RBC # FLD: 13.9 % — SIGNIFICANT CHANGE UP (ref 10.3–14.5)
RBC CASTS # UR COMP ASSIST: 1 /HPF — SIGNIFICANT CHANGE UP (ref 0–4)
SALICYLATES SERPL-MCNC: <1.7 MG/DL — LOW (ref 2.8–20)
SARS-COV-2 RNA SPEC QL NAA+PROBE: SIGNIFICANT CHANGE UP
SODIUM SERPL-SCNC: 137 MMOL/L — SIGNIFICANT CHANGE UP (ref 135–145)
SP GR SPEC: 1.02 — SIGNIFICANT CHANGE UP (ref 1–1.03)
SQUAMOUS # UR AUTO: 10 /HPF — HIGH (ref 0–5)
THC UR QL: POSITIVE — SIGNIFICANT CHANGE UP
TSH SERPL-MCNC: 1.55 UU/ML — SIGNIFICANT CHANGE UP (ref 0.34–4.82)
UROBILINOGEN FLD QL: 0.2 MG/DL — SIGNIFICANT CHANGE UP (ref 0.2–1)
WBC # BLD: 8.3 K/UL — SIGNIFICANT CHANGE UP (ref 3.8–10.5)
WBC # FLD AUTO: 8.3 K/UL — SIGNIFICANT CHANGE UP (ref 3.8–10.5)
WBC UR QL: 19 /HPF — HIGH (ref 0–5)

## 2024-06-21 PROCEDURE — 82746 ASSAY OF FOLIC ACID SERUM: CPT

## 2024-06-21 PROCEDURE — 83036 HEMOGLOBIN GLYCOSYLATED A1C: CPT

## 2024-06-21 PROCEDURE — 82040 ASSAY OF SERUM ALBUMIN: CPT

## 2024-06-21 PROCEDURE — 70450 CT HEAD/BRAIN W/O DYE: CPT | Mod: MC

## 2024-06-21 PROCEDURE — 90792 PSYCH DIAG EVAL W/MED SRVCS: CPT | Mod: 95

## 2024-06-21 PROCEDURE — 93010 ELECTROCARDIOGRAM REPORT: CPT

## 2024-06-21 PROCEDURE — 82607 VITAMIN B-12: CPT

## 2024-06-21 PROCEDURE — 84443 ASSAY THYROID STIM HORMONE: CPT

## 2024-06-21 PROCEDURE — 99285 EMERGENCY DEPT VISIT HI MDM: CPT

## 2024-06-21 PROCEDURE — 86769 SARS-COV-2 COVID-19 ANTIBODY: CPT

## 2024-06-21 PROCEDURE — 36415 COLL VENOUS BLD VENIPUNCTURE: CPT

## 2024-06-21 PROCEDURE — 82306 VITAMIN D 25 HYDROXY: CPT

## 2024-06-21 PROCEDURE — 80061 LIPID PANEL: CPT

## 2024-06-21 RX ORDER — CEPHALEXIN 500 MG
500 CAPSULE ORAL EVERY 12 HOURS
Refills: 0 | Status: COMPLETED | OUTPATIENT
Start: 2024-06-21 | End: 2024-06-26

## 2024-06-21 RX ORDER — CEPHALEXIN 500 MG
500 CAPSULE ORAL ONCE
Refills: 0 | Status: COMPLETED | OUTPATIENT
Start: 2024-06-21 | End: 2024-06-21

## 2024-06-21 RX ADMIN — Medication 500 MILLIGRAM(S): at 20:37

## 2024-06-21 NOTE — ED BEHAVIORAL HEALTH NOTE - BEHAVIORAL HEALTH NOTE
[facility] spoke to daughter Feroz R -[phone]. Aware member will be voluntarily admitted for a few nights. Daughter unaware of any issues, reports no change in mother's recent state or well being. Daughter only reports mother has sleep issues.

## 2024-06-21 NOTE — ED PROVIDER NOTE - NEUROLOGICAL, MLM
Alert and oriented, no focal deficits, no focal motor or sensory deficits. Alert and oriented, no focal deficits, no focal motor or sensory deficits.  CNs intact, normal speech

## 2024-06-21 NOTE — ED PROVIDER NOTE - MUSCULOSKELETAL, MLM
Spine appears normal, range of motion is not limited, no muscle or joint tenderness. No focal swelling or tenderness. Spine appears normal, range of motion is not limited, no muscle or joint tenderness. No focal extremity swelling or tenderness.  DELONG x 4.

## 2024-06-21 NOTE — ED PROVIDER NOTE - CLINICAL SUMMARY MEDICAL DECISION MAKING FREE TEXT BOX
49 year old female with PMHx of HTN, PCOS, insomnia on Xanax and unknown "unspecified sleeping pill" presents to ED ambulatory to ED per PCP referral regarding +SI. Patient reports x3 weeks of worsening SI thoughts and auditory hallucinations (not commanding her to hurt herself or others just hears them, are constant day and night). Persistent insomnia, anxiety with associated poor appetite, difficulty concentrating and performing tasks. No relief by current medication prescribed by Psychiatrist. Plan 1:1 observation, EKG, psych labs, telepsych consult. Observe and reassess. 49 year old female with PMHx of HTN, PCOS, insomnia on Xanax and unknown "unspecified sleeping pill" presents to ED ambulatory to ED per PCP referral regarding +SI. Patient reports x3 weeks of worsening SI thoughts and auditory hallucinations (not commanding her to hurt herself or others just hears them, are constant day and night). Persistent insomnia, anxiety with associated poor appetite, difficulty concentrating and performing tasks. No relief by current medication prescribed by Psychiatrist.   Plan 1:1 observation, EKG, psych labs, telepsych consult. Observe and reassess.    19:10, ISA Melgar MD:  Called tele-Psychiatry, they will call back when doctor is available to receive consult request.    20:10, ISA Melgar MD:  Labs notable for UTox + THC & BDZ (pt on Xanax).  U/A suggestive of UTI, pt does admit to mild dysuria & urinary frequency of recent onset.  Will treat w/ po Keflex.  Pt does not warrant med admit d/t normal WBC on CBC & no F, normal Cr.  Still awaiting Tele-Psych callback. 49 year old female with PMHx of HTN, PCOS, insomnia on Xanax and unknown "unspecified sleeping pill" presents to ED ambulatory to ED per PCP referral regarding +SI. Patient reports x3 weeks of worsening SI thoughts and auditory hallucinations (not commanding her to hurt herself or others just hears them, are constant day and night). Persistent insomnia, anxiety with associated poor appetite, difficulty concentrating and performing tasks. No relief by current medication prescribed by Psychiatrist.   Plan 1:1 observation, EKG, psych labs, telepsych consult. Observe and reassess.    19:10, ISA Melgar MD:  Called tele-Psychiatry, they will call back when doctor is available to receive consult request.    20:10, ISA Melgar MD:  Labs notable for UTox + THC & BDZ (pt on Xanax).  U/A suggestive of UTI, pt does admit to mild dysuria & urinary frequency of recent onset.  Will treat w/ po Keflex.  Pt does not warrant Med admit d/t normal WBC on CBC & no F, normal Cr.  Still awaiting Tele-Psych callback.    20:30, ISA Melgar MD:  Tele-Psychiatry aware of ED consult. 49 year old female with PMHx of HTN, PCOS, insomnia on Xanax and unknown "unspecified sleeping pill" presents to ED ambulatory to ED per PCP referral regarding +SI. Patient reports x3 weeks of worsening SI thoughts and auditory hallucinations (not commanding her to hurt herself or others, just hears them, are constant day and night). Persistent insomnia, anxiety with associated poor appetite, difficulty concentrating and performing tasks. No relief by current medication prescribed by psychiatrist.   Plan 1:1 observation, EKG, psych labs, Tele-Psych consult. Observe and reassess.    19:10, ISA Melgar MD:  Called Tele-Psychiatry, they will call back when doctor is available to receive consult request.    20:10, ISA Melgar MD:  Labs notable for UTox + THC & BDZ (pt on Xanax).  U/A suggestive of UTI, pt does admit to mild dysuria & urinary frequency of recent onset.  Will treat w/ po Keflex.  Pt does not warrant Med admit d/t normal WBC on CBC & no F, normal Cr.  Still awaiting Tele-Psych callback.    20:30, ISA Melgar MD:  Tele-Psychiatry aware of ED consult.

## 2024-06-21 NOTE — ED BEHAVIORAL HEALTH ASSESSMENT NOTE - DESCRIPTION
HTN, back pain lives with daughter and 2 granddaughters, on leave from work as [employer] ,  Patient has been calm, cooperative in ER, did not require any behavioral prns. She complied with all ER protocols.

## 2024-06-21 NOTE — ED PROVIDER NOTE - CONSTITUTIONAL, MLM
normal... Well appearing, awake, alert, oriented to person, place, time/situation and in no apparent distress. No respiratory distress

## 2024-06-21 NOTE — ED PROVIDER NOTE - ENMT, MLM
Airway patent, Nasal mucosa clear. Mouth with normal mucosa. Throat has no vesicles, oropharynx clear no oropharyngeal exudates and uvula is midline.

## 2024-06-21 NOTE — ED PROVIDER NOTE - OBJECTIVE STATEMENT
49 year old female with PMHx of HTN, PCOS, insomnia on Xanax and unknown "unspecified sleeping pill" presents to ED ambulatory to ED per PCP referral regarding +SI. Patient reports x3 weeks of worsening and persistent SI thoughts and auditory hallucinations (not commanding her to hurt herself or others just hears them, are constant day and night). Persistent insomnia, anxiety with associated poor appetite, difficulty concentrating and performing tasks. No relief by current medication prescribed by Psychiatrist. Denies precipitant x3 weeks ago to  trigger symptoms. Patient lives with daughter, denies family issues. No drug use, EtOH use or smoking other than attempted marijuana to calm self without relief. 49 year old female with PMHx of HTN, PCOS, insomnia on Xanax and unknown "unspecified sleeping pill" presents ambulatory to ED per PCP referral regarding +SI. Patient reports x3 weeks of worsening and persistent SI thoughts and auditory hallucinations (not commanding her to hurt herself or others, just hears them, are constant day and night). Persistent insomnia, anxiety with associated poor appetite, difficulty concentrating and performing tasks. No relief by current medication prescribed by psychiatrist. Denies precipitant x3 weeks ago to  trigger symptoms. Patient lives with daughter, denies family issues. No drug use, EtOH use or smoking other than attempted marijuana to calm self without relief.

## 2024-06-21 NOTE — ED ADULT NURSE REASSESSMENT NOTE - NS ED NURSE REASSESS COMMENT FT1
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

## 2024-06-21 NOTE — ED ADULT TRIAGE NOTE - CHIEF COMPLAINT QUOTE
Pt A&OX3, presenting to the ER with suicidal thoughts. Pt reports having these thoughts for the past month. Her medical doctor told her to come straight to the emergency room. Pt is hearing voices but they are not telling her to harm herself. Has a plan of banging her head against the wall. Pt states "I am very frustrated and do not want anyone around me."  Denies homicidal thoughts or visual hallucinations.   1:1 initiated in triage.

## 2024-06-21 NOTE — ED BEHAVIORAL HEALTH ASSESSMENT NOTE - PSYCHIATRIC ISSUES AND PLAN (INCLUDE STANDING AND PRN MEDICATION)
Start trazodone 50mg qhs for sleep, defer to unit for additional standing meds; PRNS: haldol 5mg, ativan 2mg, diphenhydramine 50mg, PO/IM, Q6H for Agitation Continue ambien ER 12.5mg prn, start trazodone 50mg qhs, switch xanax to klonopin 0.5mg bid; defer to unit for medication adjustments; PRNS: haldol 5mg, ativan 2mg, diphenhydramine 50mg, PO/IM, Q6H for Agitation

## 2024-06-21 NOTE — ED BEHAVIORAL HEALTH ASSESSMENT NOTE - RISK ASSESSMENT
rf - insomnia, depression, anxiety, self harm, no outpt    pf - future oriented, help seeking, no SI/SAs, no prior admissions, no substance, supportive family, identifies reasons for living, responsibility to family, fair insight

## 2024-06-21 NOTE — ED ADULT NURSE NOTE - OBJECTIVE STATEMENT
Pt c/o being depressed in the last 3 weeks heaving SI, not able to sleep for days .pt is hearing voices telling her that every thing will be okay. pt is on xanax and sleeping pill.

## 2024-06-21 NOTE — ED BEHAVIORAL HEALTH ASSESSMENT NOTE - CURRENT MEDICATION
xanax 0.5mg BID, unknown sleep aid, albuterol inhaler, lisinopril? xanax 0.5mg BID, ambien ER 12.5mg, albuterol inhaler, lisinopril?

## 2024-06-21 NOTE — ED PROVIDER NOTE - PROGRESS NOTE DETAILS
Anjel DO: Patient signed out to me by Dr. Luis. Patient is here with suicidal ideation. Plan of care is to follow-up psychiatry recs. Disposition is per psychiatry.  I personally discussed this patient's care with psychiatrist Dr. Stoll, she states patient is going to be a voluntary admission, bed available on 5 N.  Voluntary paperwork completed and placed in chart.  Admission orders placed.

## 2024-06-21 NOTE — ED BEHAVIORAL HEALTH ASSESSMENT NOTE - SUMMARY
Pt is a 48yo woman, , lives with daughter and 2 granddaughters, recently on leave from work as , with no formal pph, no prior admissions, no current outpt, no past reported suicide attempts Pt is a 48yo woman, , lives with daughter and 2 granddaughters, recently on leave from work as , with no formal pph, no prior admissions, no current outpt, no past reported suicide attempts, denies substance use other than trying cannabis for the anxiety recently, and pmh of HTN and back pain. She self presents for worsening mood, anxiety, and thoughts of death.     Patient presents with symptoms of insomnia, depression, anxiety for the past year that have acutely worsened in the last month, now resulting in self injurious behavior by hitting her head and passive suicidal thoughts. She averages 2 hours of sleep a night, and despite this, has increased energy and racing thoughts. In addition, she endorses auditory hallucinations, illusions, and some paranoia. There are no specific contributing triggers or noted stressors - patient's clinical picture appears consistent with a mixed mood episode Pt is a 48yo woman, , lives with daughter and 2 granddaughters, recently on leave from work as , with no formal pph, no prior admissions, no current outpt, no past reported suicide attempts, denies substance use other than trying cannabis for the anxiety recently, and pmh of HTN and back pain. She self presents for worsening mood, anxiety, and thoughts of death.     Patient presents with symptoms of insomnia, depression, anxiety for the past year that have acutely worsened in the last month, now resulting in self injurious behavior by hitting her head and passive suicidal thoughts. She averages 2 hours of sleep a night, and despite this, has increased energy and racing thoughts suggestive of a mixed episode or hypomanic symptoms. In addition, she endorses auditory hallucinations, illusions, and some paranoia. She is future oriented, help seeking, and appears to have insight of the departure from her baseline. There are no specific contributing triggers or psychosocial factors - patient's clinical picture appears consistent with a possible mixed mood episode with psychotic features or anxiety disorder with possible obsessive compulsive traits. She is appropriate for inpatient psych admission; she agrees to voluntary.

## 2024-06-21 NOTE — ED BEHAVIORAL HEALTH ASSESSMENT NOTE - HPI (INCLUDE ILLNESS QUALITY, SEVERITY, DURATION, TIMING, CONTEXT, MODIFYING FACTORS, ASSOCIATED SIGNS AND SYMPTOMS)
Pt is a 48yo woman, , lives with daughter and 2 granddaughters, recently on leave from work as , with no formal pph, no prior admissions, no current outpt, no past reported suicide attempts, no Pt is a 50yo woman, , lives with daughter and 2 granddaughters, recently on leave from work as , with no formal pph, no prior admissions, no current outpt, no past reported suicide attempts, denies substance use other than trying cannabis for the anxiety recently, and pmh of HTN and back pain. She self presents for worsening mood, anxiety, and thoughts of death.     Patient reports worsening insomnia, depressed mood, fatigue, anger, low frustration tolerance, and self harm by hitting her head with her hands for the past month. Some of the symptoms started a year ago however acutely progressed in the last month. She feels she is having a nervous breakdown and not herself. She describes sleeping avg 2 hours for the past 3 years, yet in the last month has increased energy. Pt also endorses thoughts of death, moreso passive SI, no active. In her frustration, she yells and slaps the sides of her head with her hands. She describes distractibility and racing thoughts, however denies any grandiosity, delusions, elevated mood, or increased goal directed behavior. There are no known inciting triggers or stressors; she attributes her symptoms mostly to lack of sleep. In the past, she used benadryl and then went to PMD who prescribed her xanax. She says the xanax is ineffective and makes her jittery. She feels paranoid, that others are talking about her at work, but also states she knows its not true. Pt desecribes seeing "blurriness", shadowy things and hears voices telling her to relax for the past 3 weeks. They are noncommand and nonpersecutory in nature. Denies HI or prior SAs. Pt is seeking voluntary admission.     See  note for collateral info from daughter Pt is a 48yo woman, , lives with daughter and 2 granddaughters, recently on leave from work as , with no formal pph, no prior admissions, no current outpt, no past reported suicide attempts, denies substance use other than trying cannabis for the anxiety recently, and pmh of HTN and back pain. She self presents for worsening mood, anxiety, and thoughts of death.     Patient reports worsening insomnia, depressed mood, fatigue, anger, low frustration tolerance, and self harm by hitting her head with her hands for the past month. Some of the symptoms started a year ago however acutely progressed in the last month. She feels she is having a nervous breakdown and not herself. She describes sleeping avg 2 hours for the past 3 years, yet in the last month has increased energy. Pt also endorses thoughts of death, moreso passive SI, no active. In her frustration, she yells and slaps the sides of her head with her hands. She describes distractibility and racing thoughts, however denies any grandiosity, delusions, elevated mood, or increased goal directed behavior. There are no known inciting triggers or stressors; she attributes her symptoms mostly to lack of sleep. In the past, she used benadryl and then went to PMD who prescribed her xanax. She says the xanax is ineffective and makes her jittery. She feels paranoid, that others are talking about her at work, but also states she knows its not true. Pt desecribes seeing "blurriness", shadowy things and hears voices telling her to relax for the past 3 weeks. They are noncommand and nonpersecutory in nature. Denies HI or prior SAs. Pt is seeking voluntary admission.     See  note for collateral info from daughter    [facility] Reference #: [id number]     B	N	Y	B	06/13/2024	06/15/2024	alprazolam 0.5 mg tablet	60	30	Meghann Lam	[id number]	Medicaid	[id number]  B	N	Y		06/13/2024	06/15/2024	zolpidem tart er 12.5 mg tab	14	14	Meghann Lam	[id number]	Medicaid	[id number]  B	N	N		06/01/2024	06/01/2024	eszopiclone 3 mg tablet	14	14	Carole Meghann	[id number]	Medicaid	[id number]  B	N	N		05/29/2024	05/30/2024	eszopiclone 1 mg tablet	3	3	Lam, Meghann	[id number]	Medicaid	[id number]  B	N	N	B	05/28/2024	05/28/2024	alprazolam 0.5 mg tablet	28	14	Lam, Meghann	[id number]	Medicaid	[id number]  B	N	N	B	05/28/2024	05/28/2024	temazepam 7.5 mg capsule	7	7	Lam, Meghann	[id number]	[facility] #[id number]  B	N	N		04/16/2024	04/26/2024	zolpidem tart er 12.5 mg tab	30	30	Lam, Meghann	[id number]	[facility] #[id number]  B	N	N	B	04/16/2024	04/16/2024	alprazolam 0.25 mg tablet	7	7	Lam, Meghann	[id number]	[facility] #[id number]

## 2024-06-22 DIAGNOSIS — G47.00 INSOMNIA, UNSPECIFIED: ICD-10-CM

## 2024-06-22 LAB
A1C WITH ESTIMATED AVERAGE GLUCOSE RESULT: 5.8 % — HIGH (ref 4–5.6)
ADD ON TEST-SPECIMEN IN LAB: SIGNIFICANT CHANGE UP
ALBUMIN SERPL ELPH-MCNC: 3.3 G/DL — SIGNIFICANT CHANGE UP (ref 3.3–5)
CHOLEST SERPL-MCNC: 205 MG/DL — HIGH
COVID-19 SPIKE DOMAIN AB INTERP: POSITIVE
COVID-19 SPIKE DOMAIN ANTIBODY RESULT: >250 U/ML — HIGH
ESTIMATED AVERAGE GLUCOSE: 120 MG/DL — HIGH (ref 68–114)
HDLC SERPL-MCNC: 57 MG/DL — SIGNIFICANT CHANGE UP
LIPID PNL WITH DIRECT LDL SERPL: 128 MG/DL — HIGH
NON HDL CHOLESTEROL: 148 MG/DL — HIGH
SARS-COV-2 IGG+IGM SERPL QL IA: >250 U/ML — HIGH
SARS-COV-2 IGG+IGM SERPL QL IA: POSITIVE
TRIGL SERPL-MCNC: 109 MG/DL — SIGNIFICANT CHANGE UP
TSH SERPL-MCNC: 3.19 UU/ML — SIGNIFICANT CHANGE UP (ref 0.34–4.82)

## 2024-06-22 PROCEDURE — 99223 1ST HOSP IP/OBS HIGH 75: CPT

## 2024-06-22 PROCEDURE — 70450 CT HEAD/BRAIN W/O DYE: CPT | Mod: 26

## 2024-06-22 PROCEDURE — 99221 1ST HOSP IP/OBS SF/LOW 40: CPT

## 2024-06-22 RX ORDER — ZOLPIDEM TARTRATE 10 MG
5 TABLET ORAL AT BEDTIME
Refills: 0 | Status: DISCONTINUED | OUTPATIENT
Start: 2024-06-22 | End: 2024-06-22

## 2024-06-22 RX ORDER — CLONAZEPAM 2 MG/1
0.5 TABLET ORAL
Refills: 0 | Status: DISCONTINUED | OUTPATIENT
Start: 2024-06-22 | End: 2024-06-29

## 2024-06-22 RX ORDER — TRAZODONE HYDROCHLORIDE 50 MG/1
75 TABLET, FILM COATED ORAL AT BEDTIME
Refills: 0 | Status: DISCONTINUED | OUTPATIENT
Start: 2024-06-22 | End: 2024-06-26

## 2024-06-22 RX ORDER — LIDOCAINE HCL 28 MG/G
1 GEL TOPICAL ONCE
Refills: 0 | Status: COMPLETED | OUTPATIENT
Start: 2024-06-22 | End: 2024-06-22

## 2024-06-22 RX ORDER — LISINOPRIL 5 MG/1
10 TABLET ORAL DAILY
Refills: 0 | Status: DISCONTINUED | OUTPATIENT
Start: 2024-06-22 | End: 2024-07-08

## 2024-06-22 RX ORDER — TRAZODONE HYDROCHLORIDE 50 MG/1
50 TABLET, FILM COATED ORAL AT BEDTIME
Refills: 0 | Status: DISCONTINUED | OUTPATIENT
Start: 2024-06-22 | End: 2024-06-22

## 2024-06-22 RX ORDER — BIOTIN/FOLIC AC/VIT BCOMP,C/ZN 3MG-0.8MG
1 TABLET ORAL DAILY
Refills: 0 | Status: CANCELLED | OUTPATIENT
Start: 2024-06-22 | End: 2024-07-08

## 2024-06-22 RX ORDER — FAMOTIDINE 40 MG
20 TABLET ORAL DAILY
Refills: 0 | Status: DISCONTINUED | OUTPATIENT
Start: 2024-06-22 | End: 2024-07-08

## 2024-06-22 RX ADMIN — LIDOCAINE HCL 1 PATCH: 28 GEL TOPICAL at 21:22

## 2024-06-22 RX ADMIN — TRAZODONE HYDROCHLORIDE 75 MILLIGRAM(S): 50 TABLET, FILM COATED ORAL at 20:50

## 2024-06-22 RX ADMIN — Medication 5 MILLIGRAM(S): at 02:52

## 2024-06-22 RX ADMIN — CLONAZEPAM 0.5 MILLIGRAM(S): 2 TABLET ORAL at 20:50

## 2024-06-22 RX ADMIN — Medication 75 MILLIGRAM(S): at 20:49

## 2024-06-22 RX ADMIN — CLONAZEPAM 0.5 MILLIGRAM(S): 2 TABLET ORAL at 09:39

## 2024-06-22 RX ADMIN — Medication 500 MILLIGRAM(S): at 20:50

## 2024-06-22 RX ADMIN — Medication 500 MILLIGRAM(S): at 09:39

## 2024-06-22 NOTE — BH SOCIAL WORK INITIAL PSYCHOSOCIAL EVALUATION - NSBHHOME_PSY_ALL_CORE
Lives home with daughter and 2 granddaughters.  Daughter: VALERIE KASPER   [phone]    Home Address: 	  [address] [place] [address]    Pt Phone: [phone]/Home with Family

## 2024-06-22 NOTE — H&P ADULT - HISTORY OF PRESENT ILLNESS
49 year old female with PMHx of HTN, PCOS, insomnia on Xanax and unknown "unspecified sleeping pill" presents to ED ambulatory to ED per PCP referral regarding +SI. Patient reports x3 weeks of worsening and persistent SI thoughts and auditory hallucinations (not commanding her to hurt herself or others just hears them, are constant day and night). Persistent insomnia, anxiety with associated poor appetite, difficulty concentrating and performing tasks. No relief by current medication prescribed by Psychiatrist. Denies precipitant x3 weeks ago to  trigger symptoms. Patient lives with daughter, denies family issues. No drug use, EtOH use or smoking other than attempted marijuana to calm self without relief.   Patient was admitted by psychiatry for suicidal ideation    patient reports some dysuria and urinary frequency no fever    In ED,  UTox positive for BZD and THC, UA positive for  UTI, patient was started on kefleX in ED, EKG        49 year old female with PMHx of HTN, PCOS, insomnia on Xanax and unknown "unspecified sleeping pill" presents to ED ambulatory to ED per PCP referral regarding +SI. Patient reports x3 weeks of worsening and persistent SI thoughts and auditory hallucinations (not commanding her to hurt herself or others just hears them, are constant day and night). Persistent insomnia, anxiety with associated poor appetite, difficulty concentrating and performing tasks. No relief by current medication prescribed by Psychiatrist. Denies precipitant x3 weeks ago to  trigger symptoms. Patient lives with daughter, denies family issues. No drug use, EtOH use or smoking other than attempted marijuana to calm self without relief.   Patient was admitted by psychiatry for suicidal ideation    patient reports some dysuria and urinary frequency no fever    In ED,  UTox positive for BZD and THC, UA positive for  UTI, patient was started on kefleX in ED, EKG sinustachycardia 103bpm early T wave repolarisation in I and AVL, nonspecific ST/T waves            49 year old female with PMHx of HTN, PCOS, insomnia on Xanax and unknown "unspecified sleeping pill" presents to ED ambulatory to ED per PCP referral regarding +SI. Patient reports x3 weeks of worsening and persistent SI thoughts and auditory hallucinations (not commanding her to hurt herself or others just hears them, are constant day and night). Persistent insomnia, anxiety with associated poor appetite, difficulty concentrating and performing tasks. No relief by current medication prescribed by Psychiatrist. Denies precipitant x3 weeks ago to  trigger symptoms. Patient lives with daughter, denies family issues. No drug use, EtOH use or smoking other than attempted marijuana to calm self without relief.   Patient was admitted by psychiatry for suicidal ideation    patient reports some dysuria and urinary frequency no fever  Patient reports her PCP recently switched her BP med to lisinopril HCTZ 10-12.5 daily, she had an ultrasound as outpatient recently ordered by her PCP which had shown renal stone on right and was told that management at this time is monitoring , also underwent colonoscopy as outpatient 2 weeks ago - at this time denies diarrhea, no melena, no BRBPR, no abd pain    In ED,  UTox positive for BZD and THC, UA positive for  UTI, patient was started on kefleX in ED, EKG sinustachycardia 103bpm early T wave repolarisation in I and AVL, nonspecific ST/T waves

## 2024-06-22 NOTE — DIETITIAN INITIAL EVALUATION ADULT - OTHER INFO
48 y/o F w/ PMHx of HTN, PCOS, insomnia on Xanax and unknown "unspecified sleeping pill" presents to ED ambulatory to ED per PCP referral regarding +SI. Reports x3 weeks of worsening and persistent SI thoughts and auditory hallucinations (not commanding her to hurt herself or others just hears them, are constant day and night). Persistent insomnia, anxiety w/ associated poor appetite, difficulty concentrating and performing tasks. No relief by current medication prescribed by Psychiatrist. Pt lives with daughter. Admitted by psychiatry for suicidal ideation.     Pt states appetite is "not that great" at present. Endorses some stomach pain, heart burn, and nausea she attributes to abx rx and mood. Endorses UBW of 145# however reports wt gain of 20# (? time frame). Unable to obtain bed scale 2/2 no bed scale present. Will use pt's reported CBW of 165# to calculate ENN - appears accurate. NFPE inappropriate at this time 2/2 pt's mental status. Currently on DASH/TLC diet - Recommend Liberalize diet to regular to maximize caloric and nutrient intake and add ensure plus high protein QD to optimize PO intake (provides 350 kcal, 20g protein/ shake). See below for further recommendations.

## 2024-06-22 NOTE — H&P ADULT - ASSESSMENT
49 year old female with PMHx of HTN, PCOS, insomnia on Xanax and unknown "unspecified sleeping pill" presents to ED ambulatory to ED per PCP referral regarding +SI. Patient reports x3 weeks of worsening and persistent SI thoughts and auditory hallucinations (not commanding her to hurt herself or others just hears them, are constant day and night). Persistent insomnia, anxiety with associated poor appetite, difficulty concentrating and performing tasks. No relief by current medication prescribed by Psychiatrist. Denies precipitant x3 weeks ago to  trigger symptoms. Patient lives with daughter, denies family issues. No drug use, EtOH use or smoking other than attempted marijuana to calm self without relief.   Patient was admitted by psychiatry for suicidal ideation    patient reports some dysuria and urinary frequency no fever    In ED,  UTox positive for BZD and THC, UA positive for  UTI, patient was started on kefleX in ED, EKG sinustachycardia 103bpm early T wave repolarisation in I and AVL, nonspecific ST/T waves    A/P  Anxiety/Suicidal Ideation  Hx BZD and marijuana use    management per Psych    HTN  resume home meds enalapril and HCTZ    UTI   started on keflex in ED 6/21 will continue for now for 5 days  f/u Ucx     Hx PCOS  f/u PCP as outpatient    dispo- continue keflex for now, if UTI symptoms worsen  or urine culture shows resistant organism - please reconsult hospitalist team   will sign off     Vte prophylaxis  Ambulate     49 year old female with PMHx of HTN, PCOS, insomnia on Xanax and unknown "unspecified sleeping pill" presents to ED ambulatory to ED per PCP referral regarding +SI. Patient reports x3 weeks of worsening and persistent SI thoughts and auditory hallucinations (not commanding her to hurt herself or others just hears them, are constant day and night). Persistent insomnia, anxiety with associated poor appetite, difficulty concentrating and performing tasks. No relief by current medication prescribed by Psychiatrist. Denies precipitant x3 weeks ago to  trigger symptoms. Patient lives with daughter, denies family issues. No drug use, EtOH use or smoking other than attempted marijuana to calm self without relief.   Patient was admitted by psychiatry for suicidal ideation    patient reports some dysuria and urinary frequency no fever  Patient reports her PCP recently switched her BP med to lisinopril HCTZ 10-12.5 daily, she had an ultrasound as outpatient recently ordered by her PCP which had shown renal stone on right and was told that management at this time is monitoring , also underwent colonoscopy as outpatient 2 weeks ago - at this time denies diarrhea, no melena, no BRBPR, no abd pain    In ED,  UTox positive for BZD and THC, UA positive for  UTI, patient was started on kefleX in ED, EKG sinustachycardia 103bpm early T wave repolarisation in I and AVL, nonspecific ST/T waves    A/P  Anxiety/Suicidal Ideation  Hx BZD and marijuana use    management per Psych    HTN  resume home meds lisinopriland HCTZ    UTI   started on keflex in ED 6/21 will continue for now for 5 days  f/u Ucx     Hx PCOS  hx recent colonoscopy   hx R renal stone   f/u PCP as outpatient    dispo- continue keflex for now, if UTI symptoms worsen  or urine culture shows resistant organism - please reconsult hospitalist team   will sign off for now    Vte prophylaxis  Ambulate

## 2024-06-22 NOTE — BH SOCIAL WORK INITIAL PSYCHOSOCIAL EVALUATION - NSBHTREATHX_PSY_ALL_CORE
Pt reports no current formal therapy history  with therapist however in phone and unable to get this info at this time, Sw to f/u monday.    most recent Rx for Xanx from PCP - Althea Vaughan -[phone]/Unable to answer (specify)

## 2024-06-22 NOTE — DIETITIAN INITIAL EVALUATION ADULT - PERTINENT LABORATORY DATA
06-21    137  |  108  |  15  ----------------------------<  148<H>  3.6   |  24  |  1.05    Ca    8.3<L>      21 Jun 2024 18:09    TPro  x   /  Alb  3.3  /  TBili  x   /  DBili  x   /  AST  x   /  ALT  x   /  AlkPhos  x   06-22

## 2024-06-22 NOTE — DIETITIAN INITIAL EVALUATION ADULT - RD TO REMAIN AVAILABLE
Subjective   Grant Edge Jr. is a 51 y.o. male. Referred for Left LE DVT     History of Present Illness   Mr. Edge is a 50-year-old male with history of hypertension, previous history of provoked DVT following total hip replacement about 6 to 7 years ago presents for further evaluation and management of left lower extremity DVT.  He noticed bilateral lower extremity swelling about 2 weeks ago following which he presented to the emergency room.  Bilateral lower extremity Doppler was performed on 5/30/2020 on which an acute left lower extremity DVT was noted in the popliteal vein.  All other veins appeared normal.  He was started on Eliquis.  He was seen by Patricia Peter on 6/3/2020 and started on hydrochlorothiazide for bilateral lower extremity edema.  He has also been referred to us for further management of the DVT.  He denies any history of tobacco use, denies any recent travel.  Denies any recent changes in his health besides the bilateral lower extremity swelling.  No significant weight loss, fatigue, night sweats, lymphadenopathy, cough, dyspnea, nausea, vomiting, hematemesis, melena, hematochezia.  Last colonoscopy was about 5 to 6 years ago in which there was a benign polyp.    Interval history  Mr. Edge presents to the clinic today for follow-up.  Insurance would not pay for Eliquis anymore and hence he is currently on Xarelto.  Tolerating Xarelto well.  Denies any active bleeding or bruising.  He reports being compliant with his CPAP machine and had a recent follow-up with Dr. Davalos.  Denies any lower extremity pain swelling discomfort.  No chest pain, dizziness, palpitations or dyspnea.  He has lost about 8 pounds in the past 3 months.  He is past due for his screening colonoscopy.    The following portions of the patient's history were reviewed and updated as appropriate: allergies, current medications, past family history, past medical history, past social history, past surgical history and problem  list.    Past Medical History:   Diagnosis Date   • Abnormal ECG    • Chest pain    • DVT (deep venous thrombosis) (CMS/HCC)    • GERD (gastroesophageal reflux disease)    • Hyperlipidemia    • Hypertension    • Hypokalemia    • Myocardial infarction (CMS/HCC)    • Simple goiter    • Sinus bradycardia    • Vitamin D deficiency     hypothyroidism  Following total thyroidectomy    Past Surgical History:   Procedure Laterality Date   • CARDIAC CATHETERIZATION N/A 10/18/2016    Procedure: Left Heart Cath;  Surgeon: Daniel Rosas MD;  Location: [facility] CATH INVASIVE LOCATION;  Service:    • CHOLECYSTECTOMY     • SALIVARY GLAND SURGERY     • THYROIDECTOMY  2013   • TONSILLECTOMY     • TOTAL HIP ARTHROPLASTY REVISION Left 2015        Family History   Problem Relation Age of Onset   • Diabetes Father    • Heart disease Father    Maternal aunt - blood clots   Maternal aunt - lupus     Social History     Socioeconomic History   • Marital status:      Spouse name: Not on file   • Number of children: Not on file   • Years of education: Not on file   • Highest education level: Not on file   Tobacco Use   • Smoking status: Never Smoker   • Smokeless tobacco: Never Used   Substance and Sexual Activity   • Alcohol use: No   • Drug use: No   • Sexual activity: Defer      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   HENT: Negative for congestion, drooling, facial swelling, mouth sores, sore throat and trouble swallowing.    Eyes: Negative for blurred vision, double vision and visual disturbance.   Respiratory: Negative for apnea, cough, chest tightness, shortness of breath, wheezing and stridor.    Cardiovascular: Positive for leg swelling. Negative for chest pain and palpitations.   Gastrointestinal: Negative for  "abdominal distention, constipation, diarrhea, nausea, vomiting and indigestion.   Endocrine: Negative for cold intolerance and heat intolerance.   Genitourinary: Negative for dysuria, hematuria and urgency.   Musculoskeletal: Negative for arthralgias, back pain and myalgias.   Skin: Negative for color change, dry skin, skin lesions and bruise.   Neurological: Negative for dizziness, seizures, syncope, facial asymmetry, weakness, light-headedness and confusion.   Hematological: Negative for adenopathy. Does not bruise/bleed easily.   Psychiatric/Behavioral: Negative for agitation, sleep disturbance and stress. The patient is not nervous/anxious.      Review of systems as mentioned in the HPI    Objective   Blood pressure 146/92, pulse 79, temperature 96.9 °F (36.1 °C), temperature source Temporal, resp. rate 17, height 177.8 cm (70\"), weight 136 kg (299 lb 1.6 oz), SpO2 97 %.   Physical Exam   Constitutional: He is oriented to person, place, and time. He appears well-developed and well-nourished. He is obese.  HENT:   Head: Normocephalic and atraumatic.   Right Ear: External ear normal.   Left Ear: External ear normal.   Mouth/Throat: Mucous membranes are moist. Oropharynx is clear.   Eyes: Right eye exhibits no discharge. Left eye exhibits no discharge.   Cardiovascular: Normal heart sounds and normal pulses.   Pulmonary/Chest: Effort normal.   Abdominal: Normal appearance and bowel sounds are normal.   Musculoskeletal: Normal range of motion.   Neurological: He is alert and oriented to person, place, and time.   Skin: Skin is dry.   Psychiatric: His behavior is normal. Judgment and thought content normal.         Lab on 07/29/2021   Component Date Value Ref Range Status   • WBC 07/29/2021 6.62  3.40 - 10.80 10*3/mm3 Final   • RBC 07/29/2021 5.76  4.14 - 5.80 10*6/mm3 Final   • Hemoglobin 07/29/2021 17.4  13.0 - 17.7 g/dL Final   • Hematocrit 07/29/2021 51.9* 37.5 - 51.0 % Final   • MCV 07/29/2021 90.1  79.0 - 97.0 " fL Final   • MCH 07/29/2021 30.2  26.6 - 33.0 pg Final   • MCHC 07/29/2021 33.5  31.5 - 35.7 g/dL Final   • RDW 07/29/2021 14.2  12.3 - 15.4 % Final   • RDW-SD 07/29/2021 46.5  37.0 - 54.0 fl Final   • MPV 07/29/2021 10.1  6.0 - 12.0 fL Final   • Platelets 07/29/2021 228  140 - 450 10*3/mm3 Final   • Neutrophil % 07/29/2021 60.2  42.7 - 76.0 % Final   • Lymphocyte % 07/29/2021 28.1  19.6 - 45.3 % Final   • Monocyte % 07/29/2021 9.2  5.0 - 12.0 % Final   • Eosinophil % 07/29/2021 1.2  0.3 - 6.2 % Final   • Basophil % 07/29/2021 0.8  0.0 - 1.5 % Final   • Immature Grans % 07/29/2021 0.5  0.0 - 0.5 % Final   • Neutrophils, Absolute 07/29/2021 3.99  1.70 - 7.00 10*3/mm3 Final   • Lymphocytes, Absolute 07/29/2021 1.86  0.70 - 3.10 10*3/mm3 Final   • Monocytes, Absolute 07/29/2021 0.61  0.10 - 0.90 10*3/mm3 Final   • Eosinophils, Absolute 07/29/2021 0.08  0.00 - 0.40 10*3/mm3 Final   • Basophils, Absolute 07/29/2021 0.05  0.00 - 0.20 10*3/mm3 Final   • Immature Grans, Absolute 07/29/2021 0.03  0.00 - 0.05 10*3/mm3 Final   • nRBC 07/29/2021 0.0  0.0 - 0.2 /100 WBC Final        No radiology results for the last 30 days.   I reviewed his left lower extremity Doppler and noted to have left lower extremity DVT.  CBC from 5/30/2020 reviewed and noted to have normal WBC, hemoglobin, platelet count.  CMP from 5/30/2020 reviewed by me creatinine mildly elevated at 1.31    Reviewed thrombo-failure work-up and documented below  Beta-2 glycoprotein and anticardiolipin antibody negative  Protein S antigen free normal  Protein S functional normal  Antithrombin III level normal  Prothrombin gene mutation negative  Factor V Leiden mutation negative.    CBC July 29, 2021-WBC 6.62, hemoglobin 17.4, hematocrit 51.9, platelets 228    Assessment/Plan      Mr. Edge is a 50-year-old -American male with a previous history of left lower extremity DVT following a left hip replacement surgery, now with newly diagnosed left lower extremity  DVT.     *DVT-  .  This is his second episode of DVT which is unprovoked.  His the first episode was provoked secondary to surgery.  Given that he has had 2 DVTs so far and the most recent one being unprovoked thrombophilia work-up has been performed and all labs are negative.  Reviewed the thrombophilia work-up with Mr. Edge.  Given that he has had 1 provoked DVT in the past but now the most recent one is unprovoked, although thrombophilia work-up is negative would recommend long-term anticoagulation to prevent any further DVTs and PEs.  Eliquis no longer covered by insurance and had switched to Xarelto  Tolerating that well.  No active bleeding or bruising  No evidence of new or recurrent DVT  Continue Xarelto for life    Also recommend age appropriate evaymjvwi-cv-iq-date on prostate cancer screening.  He is past due for colonoscopy.  Explained the importance of recommended screening and risk of DVT and PE with underlying malignancies.    *Bilateral lower extremity swelling-could be secondary to amlodipine.  This is improved.    *Hypertension-on hydrochlorothiazide, benazepril, metoprolol.  Blood pressure 146/92.    *Hypothyroidism-continue Synthroid, recent thyroid levels normal    *Obesity-discussed with Mr. dEge that obesity is a risk factor for DVT.  Recommend regular exercise and healthy diet.  BMI 42.9    *Follow-up-6 months with MELANY in 1 year with myself          yes

## 2024-06-22 NOTE — BH SOCIAL WORK INITIAL PSYCHOSOCIAL EVALUATION - NSCMSPTSTRENGTHS_PSY_ALL_CORE
Able to adapt/Assertive/Compliance to treatment/Expressive of emotions/Financial stability/Highly motivated for treatment/Intact family/Supportive family

## 2024-06-22 NOTE — DIETITIAN INITIAL EVALUATION ADULT - ORAL INTAKE PTA/DIET HISTORY
Limited diet/wt hx obtained due to pt's mental status. Lives w/ dtr. Reports decreased appetite recently however unable to quantify time frame.

## 2024-06-22 NOTE — BH INPATIENT PSYCHIATRY ASSESSMENT NOTE - NSICDXPASTMEDICALHX_GEN_ALL_CORE_FT
No respiratory distress. No stridor, Lungs sounds clear with good aeration bilaterally.
PAST MEDICAL HISTORY:  HTN (hypertension)     Migraine

## 2024-06-22 NOTE — BH INPATIENT PSYCHIATRY ASSESSMENT NOTE - NSBHASSESSSUMMFT_PSY_ALL_CORE
6/22/2024 Pt claiming that "haven't slept for the past 3 months  and the voice and seeing these shadows have been getting worse for the past month.  Pt claiming that she is "getting frustrated and I hit my own head to try to get these things to stop. "   Pt claiming to get "suicidal as I get too tired and now I cant work as I cannot concentrate." Claiming that she had a sleep study "they said my oxygen level was in the 80's" "I was supposed to go on cpap but I refused" Pt seems to believe all of the above may be "from the lack of sleep."  Pt denies intent and that she is more frustrated from the lack of sleep and tiredness.  Will get vit D levl, B12 and folate .  Pt with atrial enlargement could be secondary to the sleep apnea.

## 2024-06-22 NOTE — BH INPATIENT PSYCHIATRY ASSESSMENT NOTE - HPI (INCLUDE ILLNESS QUALITY, SEVERITY, DURATION, TIMING, CONTEXT, MODIFYING FACTORS, ASSOCIATED SIGNS AND SYMPTOMS)
Pt is a 48yo woman, , lives with daughter and 2 granddaughters, recently on leave from work as , with no formal pph, no prior admissions, no current outpt, no past reported suicide attempts, denies substance use other than trying cannabis for the anxiety recently, and pmh of HTN and back pain. She self presents for worsening mood, anxiety, and thoughts of death.     Patient reports worsening insomnia, depressed mood, fatigue, anger, low frustration tolerance, and self harm by hitting her head with her hands for the past month. Some of the symptoms started a year ago however acutely progressed in the last month. She feels she is having a nervous breakdown and not herself. She describes sleeping avg 2 hours for the past 3 years, yet in the last month has increased energy. Pt also endorses thoughts of death, moreso passive SI, no active. In her frustration, she yells and slaps the sides of her head with her hands. She describes distractibility and racing thoughts, however denies any grandiosity, delusions, elevated mood, or increased goal directed behavior. There are no known inciting triggers or stressors; she attributes her symptoms mostly to lack of sleep. In the past, she used benadryl and then went to PMD who prescribed her xanax. She says the xanax is ineffective and makes her jittery. She feels paranoid, that others are talking about her at work, but also states she knows its not true. Pt desecribes seeing "blurriness", shadowy things and hears voices telling her to relax for the past 3 weeks. They are noncommand and nonpersecutory in nature. Denies HI or prior SAs. Pt is seeking voluntary admission.     See  note for collateral info from daughter    [place] Reference #: [id number]     B	N	Y	B	06/13/2024	06/15/2024	alprazolam 0.5 mg tablet	60	30	Meghann Lam	[id number]	Medicaid	[id number]  B	N	Y		06/13/2024	06/15/2024	zolpidem tart er 12.5 mg tab	14	14	Meghann Lam	[id number]	Medicaid	[id number]  B	N	N		06/01/2024	06/01/2024	eszopiclone 3 mg tablet	14	14	Carole Meghann	[id number]	Medicaid	[id number]  B	N	N		05/29/2024	05/30/2024	eszopiclone 1 mg tablet	3	3	Lam, Meghann	[id number]	Medicaid	[id number]  B	N	N	B	05/28/2024	05/28/2024	alprazolam 0.5 mg tablet	28	14	Lam, Meghann	[id number]	Medicaid	[id number]  B	N	N	B	05/28/2024	05/28/2024	temazepam 7.5 mg capsule	7	7	Lam, Meghann	[id number]	[facility] #[id number]  B	N	N		04/16/2024	04/26/2024	zolpidem tart er 12.5 mg tab	30	30	Lam, Meghann	[id number]	[facility] #[id number]  B	N	N	B	04/16/2024	04/16/2024	alprazolam 0.25 mg tablet	7	7	Lam, Meghann	[id number]	[facility] #[id number]

## 2024-06-22 NOTE — BH INPATIENT PSYCHIATRY ASSESSMENT NOTE - NSTXDCOTHRGOAL_PSY_ALL_CORE
Patient will be referred to the appropriate level of outpatient treatment and have appointments within 3-5 days of discharge.  Pt reports no prior hx with outpt treatment/support, Sw to explore aftercare options for referral  Patient will be discharged to safe housing either back home (Home NEW UPDATED Address: [address] [facility] [address]) *** old address is still used by insurance.  ([address], [place] [address]) with family support  or DSS emergency housing.  [facility]   will explore need for dual diagnosis tx with appointments if needed.

## 2024-06-22 NOTE — BH INPATIENT PSYCHIATRY ASSESSMENT NOTE - NSBHCHARTREVIEWVS_PSY_A_CORE FT
Vital Signs Last 24 Hrs  T(C): 36.8 (06-22-24 @ 06:54), Max: 37.3 (06-21-24 @ 19:28)  T(F): 98.3 (06-22-24 @ 06:54), Max: 99.2 (06-21-24 @ 19:28)  HR: 84 (06-22-24 @ 01:48) (84 - 115)  BP: 136/93 (06-22-24 @ 01:48) (127/84 - 148/100)  BP(mean): 107 (06-22-24 @ 01:48) (101 - 113)  RR: 18 (06-22-24 @ 06:54) (17 - 19)  SpO2: 100% (06-22-24 @ 06:54) (98% - 100%)    Orthostatic VS  06-22-24 @ 06:54  Lying BP: --/-- HR: --  Sitting BP: 134/89 HR: 97  Standing BP: 130/81 HR: 93  Site: upper right arm  Mode: electronic  Orthostatic VS  06-22-24 @ 02:29  Lying BP: --/-- HR: --  Sitting BP: 150/90 HR: 91  Standing BP: 158/98 HR: 94  Site: upper left arm  Mode: --   Vital Signs Last 24 Hrs  T(C): 36.8 (06-22-24 @ 06:54), Max: 37.3 (06-21-24 @ 19:28)  T(F): 98.3 (06-22-24 @ 06:54), Max: 99.2 (06-21-24 @ 19:28)  HR: 84 (06-22-24 @ 01:48) (84 - 99)  BP: 136/93 (06-22-24 @ 01:48) (127/84 - 136/93)  BP(mean): 107 (06-22-24 @ 01:48) (101 - 107)  RR: 18 (06-22-24 @ 06:54) (17 - 18)  SpO2: 100% (06-22-24 @ 06:54) (98% - 100%)    Orthostatic VS  06-22-24 @ 06:54  Lying BP: --/-- HR: --  Sitting BP: 134/89 HR: 97  Standing BP: 130/81 HR: 93  Site: upper right arm  Mode: electronic  Orthostatic VS  06-22-24 @ 02:29  Lying BP: --/-- HR: --  Sitting BP: 150/90 HR: 91  Standing BP: 158/98 HR: 94  Site: upper left arm  Mode: --

## 2024-06-22 NOTE — BH SOCIAL WORK INITIAL PSYCHOSOCIAL EVALUATION - NSBHCHILDAGEFT_PSY_ALL_CORE
Grandchildren, Pt helps support/care for when needed  2 granddaughters :  Female: 2 yo, Female 3 yo

## 2024-06-22 NOTE — BH INPATIENT PSYCHIATRY ASSESSMENT NOTE - CURRENT MEDICATION
MEDICATIONS  (STANDING):  cephalexin 500 milliGRAM(s) Oral every 12 hours  clonazePAM  Tablet 0.5 milliGRAM(s) Oral two times a day  famotidine    Tablet 20 milliGRAM(s) Oral daily  hydrochlorothiazide 12.5 milliGRAM(s) Oral daily  lisinopril 10 milliGRAM(s) Oral daily  traZODone 50 milliGRAM(s) Oral at bedtime    MEDICATIONS  (PRN):  zolpidem 5 milliGRAM(s) Oral at bedtime PRN Insomnia  zolpidem 5 milliGRAM(s) Oral at bedtime PRN Insomnia   MEDICATIONS  (STANDING):  cephalexin 500 milliGRAM(s) Oral every 12 hours  clonazePAM  Tablet 0.5 milliGRAM(s) Oral two times a day  famotidine    Tablet 20 milliGRAM(s) Oral daily  hydrochlorothiazide 12.5 milliGRAM(s) Oral daily  lisinopril 10 milliGRAM(s) Oral daily  QUEtiapine 75 milliGRAM(s) Oral at bedtime  traZODone 50 milliGRAM(s) Oral at bedtime    MEDICATIONS  (PRN):

## 2024-06-22 NOTE — BH INPATIENT PSYCHIATRY ASSESSMENT NOTE - NSBHMETABOLIC_PSY_ALL_CORE_FT
BMI: BMI (kg/m2): 32.8 (06-22-24 @ 02:29)  HbA1c: A1C with Estimated Average Glucose Result: 5.8 % (06-22-24 @ 07:56)    Glucose:   BP: 136/93 (06-22-24 @ 01:48) (127/84 - 148/100)Vital Signs Last 24 Hrs  T(C): 36.8 (06-22-24 @ 06:54), Max: 37.3 (06-21-24 @ 19:28)  T(F): 98.3 (06-22-24 @ 06:54), Max: 99.2 (06-21-24 @ 19:28)  HR: 84 (06-22-24 @ 01:48) (84 - 115)  BP: 136/93 (06-22-24 @ 01:48) (127/84 - 148/100)  BP(mean): 107 (06-22-24 @ 01:48) (101 - 113)  RR: 18 (06-22-24 @ 06:54) (17 - 19)  SpO2: 100% (06-22-24 @ 06:54) (98% - 100%)    Orthostatic VS  06-22-24 @ 06:54  Lying BP: --/-- HR: --  Sitting BP: 134/89 HR: 97  Standing BP: 130/81 HR: 93  Site: upper right arm  Mode: electronic  Orthostatic VS  06-22-24 @ 02:29  Lying BP: --/-- HR: --  Sitting BP: 150/90 HR: 91  Standing BP: 158/98 HR: 94  Site: upper left arm  Mode: --    Lipid Panel: Date/Time: 06-22-24 @ 07:56  Cholesterol, Serum: 205  LDL Cholesterol Calculated: 128  HDL Cholesterol, Serum: 57  Total Cholesterol/HDL Ration Measurement: --  Triglycerides, Serum: 109   BMI: BMI (kg/m2): 32.8 (06-22-24 @ 02:29)  HbA1c: A1C with Estimated Average Glucose Result: 5.8 % (06-22-24 @ 07:56)    Glucose:   BP: 136/93 (06-22-24 @ 01:48) (127/84 - 148/100)Vital Signs Last 24 Hrs  T(C): 36.8 (06-22-24 @ 06:54), Max: 37.3 (06-21-24 @ 19:28)  T(F): 98.3 (06-22-24 @ 06:54), Max: 99.2 (06-21-24 @ 19:28)  HR: 84 (06-22-24 @ 01:48) (84 - 99)  BP: 136/93 (06-22-24 @ 01:48) (127/84 - 136/93)  BP(mean): 107 (06-22-24 @ 01:48) (101 - 107)  RR: 18 (06-22-24 @ 06:54) (17 - 18)  SpO2: 100% (06-22-24 @ 06:54) (98% - 100%)    Orthostatic VS  06-22-24 @ 06:54  Lying BP: --/-- HR: --  Sitting BP: 134/89 HR: 97  Standing BP: 130/81 HR: 93  Site: upper right arm  Mode: electronic  Orthostatic VS  06-22-24 @ 02:29  Lying BP: --/-- HR: --  Sitting BP: 150/90 HR: 91  Standing BP: 158/98 HR: 94  Site: upper left arm  Mode: --    Lipid Panel: Date/Time: 06-22-24 @ 07:56  Cholesterol, Serum: 205  LDL Cholesterol Calculated: 128  HDL Cholesterol, Serum: 57  Total Cholesterol/HDL Ration Measurement: --  Triglycerides, Serum: 109

## 2024-06-22 NOTE — BH SOCIAL WORK INITIAL PSYCHOSOCIAL EVALUATION - NSBHLIFEGOAL_PSY_ALL_CORE
Patient will be referred to the appropriate level of outpatient treatment and have appointments within 3-5 days of discharge.  Pt reports no prior hx with outpt treatment/support, Sw to explore aftercare options for referral  Patient will be discharged to safe housing either back home (Home Address:  [place] [address]) with family support  or DSS emergency housing.  [facility]   will explore need for dual diagnosis tx with appointments if needed.

## 2024-06-22 NOTE — BH PATIENT PROFILE - NSPROSPHOSPCHAPLAINYN_GEN_A_NUR
Telephone Encounter by Lauren Thomas CMA at 10/21/17 01:39 PM     Author:  Lauren Thomas CMA Service:  (none) Author Type:  Certified Medical Assistant     Filed:  10/21/17 01:39 PM Encounter Date:  10/9/2017 Status:  Signed     :  Lauren Thomas CMA (Certified Medical Assistant)            Patient has scheduled injection to 11/22/17[RM1.1M]      Revision History        User Key Date/Time User Provider Type Action    > RM1.1 10/21/17 01:39 PM Lauren Thomas CMA Certified Medical Assistant Sign    M - Manual             no

## 2024-06-22 NOTE — H&P ADULT - NSHPLABSRESULTS_GEN_ALL_CORE
12.5   8.30  )-----------( 272      ( 2024 18:09 )             37.7       CBC Full  -  ( 2024 18:09 )  WBC Count : 8.30 K/uL  RBC Count : 4.40 M/uL  Hemoglobin : 12.5 g/dL  Hematocrit : 37.7 %  Platelet Count - Automated : 272 K/uL  Mean Cell Volume : 85.7 fl  Mean Cell Hemoglobin : 28.4 pg  Mean Cell Hemoglobin Concentration : 33.2 gm/dL  Auto Neutrophil # : 5.30 K/uL  Auto Lymphocyte # : 2.29 K/uL  Auto Monocyte # : 0.56 K/uL  Auto Eosinophil # : 0.09 K/uL  Auto Basophil # : 0.05 K/uL  Auto Neutrophil % : 63.9 %  Auto Lymphocyte % : 27.6 %  Auto Monocyte % : 6.7 %  Auto Eosinophil % : 1.1 %  Auto Basophil % : 0.6 %          137  |  108  |  15  ----------------------------<  148<H>  3.6   |  24  |  1.05    Ca    8.3<L>      2024 18:09    TPro  7.4  /  Alb  3.2<L>  /  TBili  0.2  /  DBili  x   /  AST  21  /  ALT  24  /  AlkPhos  77  06-21      LIVER FUNCTIONS - ( 2024 18:09 )  Alb: 3.2 g/dL / Pro: 7.4 gm/dL / ALK PHOS: 77 U/L / ALT: 24 U/L / AST: 21 U/L / GGT: x                       Urinalysis Basic - ( 2024 18:09 )    Color: Yellow / Appearance: Clear / S.019 / pH: x  Gluc: 148 mg/dL / Ketone: Negative mg/dL  / Bili: Negative / Urobili: 0.2 mg/dL   Blood: x / Protein: Negative mg/dL / Nitrite: Negative   Leuk Esterase: Small / RBC: 1 /HPF / WBC 19 /HPF   Sq Epi: x / Non Sq Epi: 10 /HPF / Bacteria: Moderate /HPF            MEDICATIONS  (STANDING):  cephalexin 500 milliGRAM(s) Oral every 12 hours  clonazePAM  Tablet 0.5 milliGRAM(s) Oral two times a day  traZODone 50 milliGRAM(s) Oral at bedtime

## 2024-06-22 NOTE — DIETITIAN INITIAL EVALUATION ADULT - ADD RECOMMEND
1. Recommend Liberalize diet to regular to maximize caloric and nutrient intake.   2. Add ensure plus high protein QD to optimize PO intake (provides 350 kcal, 20g protein/ shake)   3. MVI w/ minerals daily to ensure 100% RDA met   4. Encourage protein-rich foods, maximize food preferences   5. Monitor bowel movements, if no BM for >3 days, consider implementing bowel regimen.   6. Obtain vitamin D 25OH level to assess nutriture   7. Please obtain weekly weights   8. Confirm goals of care regarding nutrition support   RD will continue to monitor PO intake, labs, hydration, and wt prn.

## 2024-06-22 NOTE — BH PATIENT PROFILE - HOME MEDICATIONS
famotidine 20 mg oral tablet , 1 tab(s) orally 2 times a day  enalapril-hydrochlorothiazide 5 mg-12.5 mg oral tablet , 1 tab(s) orally once a day

## 2024-06-22 NOTE — BH SOCIAL WORK INITIAL PSYCHOSOCIAL EVALUATION - OTHER PAST PSYCHIATRIC HISTORY (INCLUDE DETAILS REGARDING ONSET, COURSE OF ILLNESS, INPATIENT/OUTPATIENT TREATMENT)
Vol. Admit    Pt is a 50 yo [place] woman, who self presents for worsening mood, anxiety, and thoughts of death.  Pt reports she is  for 15 yrs, has 2 adult daughters- lives with daughter Feroz/Son-in-law and 2 granddaughters ages 1 and 3, recently on leave from work as  at [employer], with no formal pph, no prior admissions, no current outpt, no past reported suicide attempts, denies substance use other than trying cannabis for the anxiety recently, and pmh of HTN and back pain.   Pt reports increased depression and anxiety, worsening insomnia, depressed mood, fatigue, anger, low frustration tolerance, racing thoughts and self harm by hitting her head with her hands for the past month.  Pt reports thoughts of death/ passive SI, no active SA. Pt reports sometimes seeing  "blurriness of dark shadows" and hearing voices telling Pt to "relax, calm down", denies commanding AH/VH. Pt reports feeling paranoid and anxious.     Medication Hx:  To help with sleep Pt reports using benadryl, no longer effective so Pt PCP (Althea Vaughan -[phone]) prescribed her xanax, Pt reports she stopped taking this because it makes her feel jittery and ineffective. As per chart; previous [facility] note reports "Daughter unaware of any issues, reports no change in mother's recent state or well being. Daughter only reports mother has sleep issues needs to use CPAP- non compliant, Pt had sleep study done within yr".     Daughter: FEROZ KASPER   [phone]    Home Address: 	NEW HOME ADDRESS: [address] [place] [id number]    address used with health insurance: [address] [place] [address]    Pt Phone: [phone]

## 2024-06-22 NOTE — H&P ADULT - NSHPPHYSICALEXAM_GEN_ALL_CORE
PHYSICAL EXAM:    Daily Height in cm: 152.4 (2024 02:29)    Daily Weight in k.5 (2024 06:54)    ICU Vital Signs Last 24 Hrs  T(C): 36.8 (2024 06:54), Max: 37.3 (2024 19:28)  T(F): 98.3 (2024 06:54), Max: 99.2 (2024 19:28)  HR: 84 (2024 01:48) (84 - 115)  BP: 136/93 (2024 01:48) (127/84 - 148/100)  BP(mean): 107 (2024 01:48) (101 - 113)  ABP: --  ABP(mean): --  RR: 18 (2024 06:54) (17 - 19)  SpO2: 100% (2024 06:54) (98% - 100%)    O2 Parameters below as of 2024 06:54  Patient On (Oxygen Delivery Method): room air            Constitutional: Well appearing  HEENT: Atraumatic, MERVIN, Normal, No congestion  Respiratory: Breath Sounds normal, no rhonchi/wheeze  Cardiovascular: N S1S2;  Gastrointestinal: Abdomen soft, non tender, Bowel Ssounds present  Extremities: No edema, peripheral pulses present  Neurological: AAO x 3, no gross focal motor deficits  Skin: Non cellulitic, no rash, ulcers  Back: No CVA tenderness

## 2024-06-22 NOTE — DIETITIAN INITIAL EVALUATION ADULT - PERTINENT MEDS FT
MEDICATIONS  (STANDING):  cephalexin 500 milliGRAM(s) Oral every 12 hours  clonazePAM  Tablet 0.5 milliGRAM(s) Oral two times a day  traZODone 50 milliGRAM(s) Oral at bedtime    MEDICATIONS  (PRN):  zolpidem 5 milliGRAM(s) Oral at bedtime PRN Insomnia  zolpidem 5 milliGRAM(s) Oral at bedtime PRN Insomnia

## 2024-06-23 LAB
24R-OH-CALCIDIOL SERPL-MCNC: 20.1 NG/ML — LOW (ref 30–80)
FOLATE SERPL-MCNC: 9.6 NG/ML — SIGNIFICANT CHANGE UP
VIT B12 SERPL-MCNC: 425 PG/ML — SIGNIFICANT CHANGE UP (ref 232–1245)

## 2024-06-23 PROCEDURE — 99232 SBSQ HOSP IP/OBS MODERATE 35: CPT

## 2024-06-23 RX ORDER — LIDOCAINE HCL 28 MG/G
1 GEL TOPICAL DAILY
Refills: 0 | Status: DISCONTINUED | OUTPATIENT
Start: 2024-06-23 | End: 2024-07-08

## 2024-06-23 RX ADMIN — Medication 150 MILLIGRAM(S): at 21:21

## 2024-06-23 RX ADMIN — CLONAZEPAM 0.5 MILLIGRAM(S): 2 TABLET ORAL at 09:55

## 2024-06-23 RX ADMIN — Medication 500 MILLIGRAM(S): at 21:20

## 2024-06-23 RX ADMIN — CLONAZEPAM 0.5 MILLIGRAM(S): 2 TABLET ORAL at 21:21

## 2024-06-23 RX ADMIN — LIDOCAINE HCL 1 PATCH: 28 GEL TOPICAL at 09:56

## 2024-06-23 RX ADMIN — Medication 20 MILLIGRAM(S): at 09:55

## 2024-06-23 RX ADMIN — LISINOPRIL 10 MILLIGRAM(S): 5 TABLET ORAL at 09:55

## 2024-06-23 RX ADMIN — LIDOCAINE HCL 1 PATCH: 28 GEL TOPICAL at 21:21

## 2024-06-23 RX ADMIN — TRAZODONE HYDROCHLORIDE 75 MILLIGRAM(S): 50 TABLET, FILM COATED ORAL at 21:21

## 2024-06-23 RX ADMIN — LIDOCAINE HCL 1 PATCH: 28 GEL TOPICAL at 08:22

## 2024-06-23 RX ADMIN — Medication 500 MILLIGRAM(S): at 09:55

## 2024-06-23 NOTE — BH INPATIENT PSYCHIATRY PROGRESS NOTE - NSBHFUPINTERVALHXFT_PSY_A_CORE
6/23/2024 Pt with sleep yesterday night and pt is willing to continue with the medication . Pt claiming to be feeling less anxious although the auditory hallucination remains . Pt is still isolative and stayed in her room .  Pt denies any suicide thoughts intent or plan and claiming to feel hopeful of getting better

## 2024-06-24 PROCEDURE — 99232 SBSQ HOSP IP/OBS MODERATE 35: CPT

## 2024-06-24 RX ADMIN — Medication 20 MILLIGRAM(S): at 09:53

## 2024-06-24 RX ADMIN — Medication 500 MILLIGRAM(S): at 09:52

## 2024-06-24 RX ADMIN — CLONAZEPAM 0.5 MILLIGRAM(S): 2 TABLET ORAL at 09:52

## 2024-06-24 RX ADMIN — Medication 500 MILLIGRAM(S): at 20:05

## 2024-06-24 RX ADMIN — LISINOPRIL 10 MILLIGRAM(S): 5 TABLET ORAL at 09:53

## 2024-06-24 RX ADMIN — CLONAZEPAM 0.5 MILLIGRAM(S): 2 TABLET ORAL at 20:05

## 2024-06-24 RX ADMIN — TRAZODONE HYDROCHLORIDE 75 MILLIGRAM(S): 50 TABLET, FILM COATED ORAL at 20:05

## 2024-06-24 RX ADMIN — LIDOCAINE HCL 1 PATCH: 28 GEL TOPICAL at 20:08

## 2024-06-24 RX ADMIN — Medication 150 MILLIGRAM(S): at 20:06

## 2024-06-24 NOTE — BH INPATIENT PSYCHIATRY PROGRESS NOTE - NSBHFUPINTERVALHXFT_PSY_A_CORE
Patient states she feels depressed that she has suicidal thinking but denies any plan or intent to act upon them.  No specific plan.  Patient is seeking help.  She wants to know if her CAT scan of the brain showed anything.  She still is anxious and she still depressed.  Denies any auditory or visual donations.  Patient does not  have PI.

## 2024-06-24 NOTE — PSYCHIATRIC REHAB INITIAL EVALUATION - NSBHPRRECOMMEND_PSY_ALL_CORE
Patient identified the exercise bike, tv, magazines and word puzzles as coping skills to utilize on the unit.

## 2024-06-24 NOTE — BH SAFETY PLAN - ENVIRONMENT SAFETY 3:
I have a lot of support from my family and I can tell my sister or daughters if I ever feel unsafe.

## 2024-06-24 NOTE — BH SAFETY PLAN - ENVIRONMENT SAFETY 1:
Finding the right medication to help me. I feel depressed and irritable. I need to be able to get some sleep.

## 2024-06-24 NOTE — BH SAFETY PLAN - WARNING SIGN 1
Triggers: Not getting enough sleep for past 2 years. Stressful  finalized 2 years ago.   Started to feel hopeless.

## 2024-06-24 NOTE — BH SAFETY PLAN - WARNING SIGN 4
Seeing these flashes and shadows. Voices talking to me in my head. I felt desperate for them to stop and started hitting my head.

## 2024-06-25 PROCEDURE — 99232 SBSQ HOSP IP/OBS MODERATE 35: CPT

## 2024-06-25 RX ORDER — BUPROPION HYDROCHLORIDE 150 MG/1
37.5 TABLET, EXTENDED RELEASE ORAL DAILY
Refills: 0 | Status: DISCONTINUED | OUTPATIENT
Start: 2024-06-26 | End: 2024-06-26

## 2024-06-25 RX ADMIN — CLONAZEPAM 0.5 MILLIGRAM(S): 2 TABLET ORAL at 21:11

## 2024-06-25 RX ADMIN — CLONAZEPAM 0.5 MILLIGRAM(S): 2 TABLET ORAL at 10:09

## 2024-06-25 RX ADMIN — TRAZODONE HYDROCHLORIDE 75 MILLIGRAM(S): 50 TABLET, FILM COATED ORAL at 21:17

## 2024-06-25 RX ADMIN — Medication 500 MILLIGRAM(S): at 21:11

## 2024-06-25 RX ADMIN — Medication 500 MILLIGRAM(S): at 10:09

## 2024-06-25 RX ADMIN — LISINOPRIL 10 MILLIGRAM(S): 5 TABLET ORAL at 10:09

## 2024-06-25 RX ADMIN — Medication 20 MILLIGRAM(S): at 10:09

## 2024-06-25 RX ADMIN — Medication 200 MILLIGRAM(S): at 21:11

## 2024-06-25 RX ADMIN — LIDOCAINE HCL 1 PATCH: 28 GEL TOPICAL at 21:11

## 2024-06-25 NOTE — BH INPATIENT PSYCHIATRY PROGRESS NOTE - NSBHFUPINTERVALHXFT_PSY_A_CORE
06/25/2024: Patient was seen today AM, chart reviewed and discussed in team. She endorses that she was not able to sleep so has been feeling depressed with passive SI. She added that she also hears voices, non-command in type and directing OK to continue. No prior Psych admission and currently depressed with A/H, Seroquel increased to 200 mg HS and added Wellbutrin 37.5 mg HS to help with mood. No drug abuse, U-tox was positive for Cannabis. To continue current meds, she has been sleeping 5 hours since she came here.    Patient states she feels depressed that she has suicidal thinking but denies any plan or intent to act upon them.  No specific plan.  Patient is seeking help.  She wants to know if her CAT scan of the brain showed anything.  She still is anxious and she still depressed.  Denies any auditory or visual donations.  Patient does not  have PI.

## 2024-06-25 NOTE — BH TREATMENT PLAN - NSTXCOPEINTERMD_PSY_ALL_CORE
Anxiety/Depression with Passive SI with A/H: On Seroquel trial, now increased to 200 mg HS with Wellbutrin 37.5 mg Daily. Fair to good sleep now. A/H are less but still there. Trazodone 75 mg HS PRN insomnia

## 2024-06-26 PROCEDURE — 99232 SBSQ HOSP IP/OBS MODERATE 35: CPT

## 2024-06-26 RX ORDER — TRAZODONE HYDROCHLORIDE 50 MG/1
50 TABLET, FILM COATED ORAL AT BEDTIME
Refills: 0 | Status: DISCONTINUED | OUTPATIENT
Start: 2024-06-26 | End: 2024-06-27

## 2024-06-26 RX ORDER — BUPROPION HYDROCHLORIDE 150 MG/1
75 TABLET, EXTENDED RELEASE ORAL DAILY
Refills: 0 | Status: DISCONTINUED | OUTPATIENT
Start: 2024-06-27 | End: 2024-06-27

## 2024-06-26 RX ORDER — ACETAMINOPHEN 325 MG
650 TABLET ORAL EVERY 6 HOURS
Refills: 0 | Status: DISCONTINUED | OUTPATIENT
Start: 2024-06-26 | End: 2024-07-08

## 2024-06-26 RX ADMIN — LIDOCAINE HCL 1 PATCH: 28 GEL TOPICAL at 14:30

## 2024-06-26 RX ADMIN — TRAZODONE HYDROCHLORIDE 50 MILLIGRAM(S): 50 TABLET, FILM COATED ORAL at 20:25

## 2024-06-26 RX ADMIN — LIDOCAINE HCL 1 PATCH: 28 GEL TOPICAL at 23:38

## 2024-06-26 RX ADMIN — LISINOPRIL 10 MILLIGRAM(S): 5 TABLET ORAL at 10:51

## 2024-06-26 RX ADMIN — Medication 500 MILLIGRAM(S): at 22:51

## 2024-06-26 RX ADMIN — CLONAZEPAM 0.5 MILLIGRAM(S): 2 TABLET ORAL at 10:43

## 2024-06-26 RX ADMIN — Medication 650 MILLIGRAM(S): at 20:25

## 2024-06-26 RX ADMIN — Medication 650 MILLIGRAM(S): at 21:25

## 2024-06-26 RX ADMIN — Medication 650 MILLIGRAM(S): at 14:56

## 2024-06-26 RX ADMIN — Medication 20 MILLIGRAM(S): at 10:43

## 2024-06-26 RX ADMIN — BUPROPION HYDROCHLORIDE 37.5 MILLIGRAM(S): 150 TABLET, EXTENDED RELEASE ORAL at 10:41

## 2024-06-26 RX ADMIN — LIDOCAINE HCL 1 PATCH: 28 GEL TOPICAL at 07:00

## 2024-06-26 RX ADMIN — Medication 300 MILLIGRAM(S): at 20:25

## 2024-06-26 RX ADMIN — Medication 650 MILLIGRAM(S): at 14:06

## 2024-06-26 RX ADMIN — Medication 500 MILLIGRAM(S): at 10:42

## 2024-06-26 RX ADMIN — CLONAZEPAM 0.5 MILLIGRAM(S): 2 TABLET ORAL at 22:51

## 2024-06-26 NOTE — BH INPATIENT PSYCHIATRY PROGRESS NOTE - NSBHFUPINTERVALHXFT_PSY_A_CORE
06/26/2024: Patient was seen today AM, chart reviewed and discussed in team. Patient endorses that she was not able to sleep at all last night, Seroquel was earlier increased to 200 mg HS yesterday and to Seroquel further increased to 300 mg HS for sleep/A-Hallucinations. Patient also depressed and need to have increased dosage of Wellbutrin to 75 mg daily for further improvement.    06/25/2024: Patient was seen today AM, chart reviewed and discussed in team. She endorses that she was not able to sleep so has been feeling depressed with passive SI. She added that she also hears voices, non-command in type and directing OK to continue. No prior Psych admission and currently depressed with A/H, Seroquel increased to 200 mg HS and added Wellbutrin 37.5 mg HS to help with mood. No drug abuse, U-tox was positive for Cannabis. To continue current meds, she has been sleeping 5 hours since she came here.    Patient states she feels depressed that she has suicidal thinking but denies any plan or intent to act upon them.  No specific plan.  Patient is seeking help.  She wants to know if her CAT scan of the brain showed anything.  She still is anxious and she still depressed.  Denies any auditory or visual donations.  Patient does not  have PI.

## 2024-06-27 PROCEDURE — 99232 SBSQ HOSP IP/OBS MODERATE 35: CPT

## 2024-06-27 RX ORDER — TRAZODONE HYDROCHLORIDE 50 MG/1
50 TABLET, FILM COATED ORAL
Refills: 0 | Status: DISCONTINUED | OUTPATIENT
Start: 2024-06-27 | End: 2024-06-27

## 2024-06-27 RX ORDER — RISPERIDONE 0.5 MG/1
1 TABLET ORAL
Refills: 0 | Status: DISCONTINUED | OUTPATIENT
Start: 2024-06-27 | End: 2024-07-03

## 2024-06-27 RX ORDER — BUPROPION HYDROCHLORIDE 150 MG/1
150 TABLET, EXTENDED RELEASE ORAL DAILY
Refills: 0 | Status: DISCONTINUED | OUTPATIENT
Start: 2024-06-28 | End: 2024-07-08

## 2024-06-27 RX ORDER — TRAZODONE HYDROCHLORIDE 50 MG/1
50 TABLET, FILM COATED ORAL
Refills: 0 | Status: DISCONTINUED | OUTPATIENT
Start: 2024-06-27 | End: 2024-07-08

## 2024-06-27 RX ADMIN — Medication 20 MILLIGRAM(S): at 10:34

## 2024-06-27 RX ADMIN — LISINOPRIL 10 MILLIGRAM(S): 5 TABLET ORAL at 10:35

## 2024-06-27 RX ADMIN — CLONAZEPAM 0.5 MILLIGRAM(S): 2 TABLET ORAL at 20:27

## 2024-06-27 RX ADMIN — Medication 300 MILLIGRAM(S): at 20:27

## 2024-06-27 RX ADMIN — RISPERIDONE 1 MILLIGRAM(S): 0.5 TABLET ORAL at 20:28

## 2024-06-27 RX ADMIN — BUPROPION HYDROCHLORIDE 75 MILLIGRAM(S): 150 TABLET, EXTENDED RELEASE ORAL at 10:34

## 2024-06-27 RX ADMIN — LIDOCAINE HCL 1 PATCH: 28 GEL TOPICAL at 22:00

## 2024-06-27 RX ADMIN — LIDOCAINE HCL 1 PATCH: 28 GEL TOPICAL at 19:00

## 2024-06-27 RX ADMIN — CLONAZEPAM 0.5 MILLIGRAM(S): 2 TABLET ORAL at 10:34

## 2024-06-27 RX ADMIN — TRAZODONE HYDROCHLORIDE 50 MILLIGRAM(S): 50 TABLET, FILM COATED ORAL at 20:27

## 2024-06-27 RX ADMIN — LIDOCAINE HCL 1 PATCH: 28 GEL TOPICAL at 10:34

## 2024-06-27 RX ADMIN — LIDOCAINE HCL 1 PATCH: 28 GEL TOPICAL at 02:30

## 2024-06-27 NOTE — BH INPATIENT PSYCHIATRY PROGRESS NOTE - NSBHFUPINTERVALHXFT_PSY_A_CORE
06/27/2024: Patient was seen today AM, chart reviewed and discussed in team. She continues to hear voices, derogatory in nature, with passive SI, she had better sleep last night and added that she was given meds at 11 PM and was able to sleep from 4:30 AM till today afternoon. Risperdal added to help decrease her A/H will f/u in AM, Wellbutrin further increased to 150 mg Daily.    06/26/2024: Patient was seen today AM, chart reviewed and discussed in team. Patient endorses that she was not able to sleep at all last night, Seroquel was earlier increased to 200 mg HS yesterday and to Seroquel further increased to 300 mg HS for sleep/A-Hallucinations. Patient also depressed and need to have increased dosage of Wellbutrin to 75 mg daily for further improvement.    06/25/2024: Patient was seen today AM, chart reviewed and discussed in team. She endorses that she was not able to sleep so has been feeling depressed with passive SI. She added that she also hears voices, non-command in type and directing OK to continue. No prior Psych admission and currently depressed with A/H, Seroquel increased to 200 mg HS and added Wellbutrin 37.5 mg HS to help with mood. No drug abuse, U-tox was positive for Cannabis. To continue current meds, she has been sleeping 5 hours since she came here.    Patient states she feels depressed that she has suicidal thinking but denies any plan or intent to act upon them.  No specific plan.  Patient is seeking help.  She wants to know if her CAT scan of the brain showed anything.  She still is anxious and she still depressed.  Denies any auditory or visual donations.  Patient does not  have PI.

## 2024-06-28 PROCEDURE — 99232 SBSQ HOSP IP/OBS MODERATE 35: CPT

## 2024-06-28 RX ORDER — RISPERIDONE 0.5 MG/1
1 TABLET ORAL DAILY
Refills: 0 | Status: DISCONTINUED | OUTPATIENT
Start: 2024-06-28 | End: 2024-07-08

## 2024-06-28 RX ADMIN — RISPERIDONE 1 MILLIGRAM(S): 0.5 TABLET ORAL at 13:26

## 2024-06-28 RX ADMIN — CLONAZEPAM 0.5 MILLIGRAM(S): 2 TABLET ORAL at 21:04

## 2024-06-28 RX ADMIN — Medication 20 MILLIGRAM(S): at 09:27

## 2024-06-28 RX ADMIN — BUPROPION HYDROCHLORIDE 150 MILLIGRAM(S): 150 TABLET, EXTENDED RELEASE ORAL at 09:26

## 2024-06-28 RX ADMIN — CLONAZEPAM 0.5 MILLIGRAM(S): 2 TABLET ORAL at 09:27

## 2024-06-28 RX ADMIN — RISPERIDONE 1 MILLIGRAM(S): 0.5 TABLET ORAL at 21:04

## 2024-06-28 RX ADMIN — TRAZODONE HYDROCHLORIDE 50 MILLIGRAM(S): 50 TABLET, FILM COATED ORAL at 21:04

## 2024-06-28 RX ADMIN — LIDOCAINE HCL 1 PATCH: 28 GEL TOPICAL at 21:03

## 2024-06-28 RX ADMIN — Medication 300 MILLIGRAM(S): at 21:04

## 2024-06-28 NOTE — BH INPATIENT PSYCHIATRY PROGRESS NOTE - NSBHFUPINTERVALHXFT_PSY_A_CORE
06/28/2024: Patient was seen tody AM, chart reviewed and discussed in team. She endorses that she was given meds earlier last night, had a good sleep, her A/H are less and is up and about today AM, rather than spending time in bed. Risperdal further increased to 2 mg BID. To continue other meds for further stability/safety. Overall a better picture than yesterday.      06/27/2024: Patient was seen today AM, chart reviewed and discussed in team. She continues to hear voices, derogatory in nature, with passive SI, she had better sleep last night and added that she was given meds at 11 PM and was able to sleep from 4:30 AM till today afternoon. Risperdal added to help decrease her A/H will f/u in AM, Wellbutrin further increased to 150 mg Daily.    06/26/2024: Patient was seen today AM, chart reviewed and discussed in team. Patient endorses that she was not able to sleep at all last night, Seroquel was earlier increased to 200 mg HS yesterday and to Seroquel further increased to 300 mg HS for sleep/A-Hallucinations. Patient also depressed and need to have increased dosage of Wellbutrin to 75 mg daily for further improvement.    06/25/2024: Patient was seen today AM, chart reviewed and discussed in team. She endorses that she was not able to sleep so has been feeling depressed with passive SI. She added that she also hears voices, non-command in type and directing OK to continue. No prior Psych admission and currently depressed with A/H, Seroquel increased to 200 mg HS and added Wellbutrin 37.5 mg HS to help with mood. No drug abuse, U-tox was positive for Cannabis. To continue current meds, she has been sleeping 5 hours since she came here.    Patient states she feels depressed that she has suicidal thinking but denies any plan or intent to act upon them.  No specific plan.  Patient is seeking help.  She wants to know if her CAT scan of the brain showed anything.  She still is anxious and she still depressed.  Denies any auditory or visual donations.  Patient does not  have PI.

## 2024-06-29 RX ADMIN — Medication 300 MILLIGRAM(S): at 20:46

## 2024-06-29 RX ADMIN — RISPERIDONE 1 MILLIGRAM(S): 0.5 TABLET ORAL at 09:01

## 2024-06-29 RX ADMIN — TRAZODONE HYDROCHLORIDE 50 MILLIGRAM(S): 50 TABLET, FILM COATED ORAL at 20:46

## 2024-06-29 RX ADMIN — Medication 20 MILLIGRAM(S): at 09:01

## 2024-06-29 RX ADMIN — LISINOPRIL 10 MILLIGRAM(S): 5 TABLET ORAL at 09:01

## 2024-06-29 RX ADMIN — CLONAZEPAM 0.5 MILLIGRAM(S): 2 TABLET ORAL at 20:47

## 2024-06-29 RX ADMIN — CLONAZEPAM 0.5 MILLIGRAM(S): 2 TABLET ORAL at 09:01

## 2024-06-29 RX ADMIN — BUPROPION HYDROCHLORIDE 150 MILLIGRAM(S): 150 TABLET, EXTENDED RELEASE ORAL at 09:01

## 2024-06-29 RX ADMIN — LIDOCAINE HCL 1 PATCH: 28 GEL TOPICAL at 20:45

## 2024-06-29 RX ADMIN — RISPERIDONE 1 MILLIGRAM(S): 0.5 TABLET ORAL at 20:49

## 2024-06-30 RX ORDER — CLONAZEPAM 2 MG/1
0.5 TABLET ORAL
Refills: 0 | Status: DISCONTINUED | OUTPATIENT
Start: 2024-06-30 | End: 2024-07-07

## 2024-06-30 RX ADMIN — LIDOCAINE HCL 1 PATCH: 28 GEL TOPICAL at 21:13

## 2024-06-30 RX ADMIN — Medication 20 MILLIGRAM(S): at 09:30

## 2024-06-30 RX ADMIN — Medication 300 MILLIGRAM(S): at 21:13

## 2024-06-30 RX ADMIN — CLONAZEPAM 0.5 MILLIGRAM(S): 2 TABLET ORAL at 21:13

## 2024-06-30 RX ADMIN — LISINOPRIL 10 MILLIGRAM(S): 5 TABLET ORAL at 09:30

## 2024-06-30 RX ADMIN — RISPERIDONE 1 MILLIGRAM(S): 0.5 TABLET ORAL at 09:30

## 2024-06-30 RX ADMIN — RISPERIDONE 1 MILLIGRAM(S): 0.5 TABLET ORAL at 21:13

## 2024-06-30 RX ADMIN — BUPROPION HYDROCHLORIDE 150 MILLIGRAM(S): 150 TABLET, EXTENDED RELEASE ORAL at 09:29

## 2024-06-30 RX ADMIN — TRAZODONE HYDROCHLORIDE 50 MILLIGRAM(S): 50 TABLET, FILM COATED ORAL at 21:13

## 2024-07-01 PROCEDURE — 99232 SBSQ HOSP IP/OBS MODERATE 35: CPT

## 2024-07-01 RX ADMIN — Medication 20 MILLIGRAM(S): at 09:15

## 2024-07-01 RX ADMIN — RISPERIDONE 1 MILLIGRAM(S): 0.5 TABLET ORAL at 09:14

## 2024-07-01 RX ADMIN — Medication 200 MILLIGRAM(S): at 20:29

## 2024-07-01 RX ADMIN — TRAZODONE HYDROCHLORIDE 50 MILLIGRAM(S): 50 TABLET, FILM COATED ORAL at 20:30

## 2024-07-01 RX ADMIN — CLONAZEPAM 0.5 MILLIGRAM(S): 2 TABLET ORAL at 09:15

## 2024-07-01 RX ADMIN — LIDOCAINE HCL 1 PATCH: 28 GEL TOPICAL at 20:30

## 2024-07-01 RX ADMIN — LISINOPRIL 10 MILLIGRAM(S): 5 TABLET ORAL at 09:15

## 2024-07-01 RX ADMIN — BUPROPION HYDROCHLORIDE 150 MILLIGRAM(S): 150 TABLET, EXTENDED RELEASE ORAL at 09:15

## 2024-07-01 RX ADMIN — CLONAZEPAM 0.5 MILLIGRAM(S): 2 TABLET ORAL at 20:31

## 2024-07-01 RX ADMIN — RISPERIDONE 1 MILLIGRAM(S): 0.5 TABLET ORAL at 20:29

## 2024-07-01 NOTE — BH TREATMENT PLAN - NSTXCOPEINTERMD_PSY_ALL_CORE
Anxiety/Depression with Passive SI with A/H: On Seroquel trial, now increased to 200 mg HS with Wellbutrin 37.5 mg Daily. Fair to good sleep now. A/H are less but still there. Trazodone 75 mg HS PRN insomnia  Psychosis-Responded to Risperdal 1 mg BID trial. She had a better sleep from Seroquel trial, but Seroquel trial of 300 mg Daily need to reduce to 200 mg HS for now. Depression improving on Wellbutrin  mg Trial.

## 2024-07-01 NOTE — BH TREATMENT PLAN - NSTXDCOTHRGOAL_PSY_ALL_CORE
Patient will be referred to the appropriate level of outpatient treatment and have appointments within 3-5 days of discharge.  Pt reports no prior hx with outpt treatment/support, Sw to explore aftercare options for referral  Patient will be discharged to safe housing either back home (Home NEW UPDATED Address: [address] [facility] [address]) *** old address is still used by insurance.  ([address], [place] [address]) with family support  or DSS emergency housing.  [facility]   will explore need for dual diagnosis tx with appointments if needed.
Patient will be referred to the appropriate level of outpatient treatment and have appointments within 3-5 days of discharge.  Pt reports no prior hx with outpt treatment/support, Sw to explore aftercare options for referral  Patient will be discharged to safe housing either back home (Home NEW UPDATED Address: [address] [facility] [address]) *** old address is still used by insurance.  ([address], [place] [address]) with family support  or DSS emergency housing.  [facility]   will explore need for dual diagnosis tx with appointments if needed.

## 2024-07-01 NOTE — BH TREATMENT PLAN - NSCMSPTSTRENGTHS_PSY_ALL_CORE
Able to adapt/Assertive/Compliance to treatment/Expressive of emotions/Financial stability/Highly motivated for treatment/Intact family/Supportive family
Able to adapt/Assertive/Compliance to treatment/Expressive of emotions/Financial stability/Highly motivated for treatment/Intact family/Supportive family

## 2024-07-01 NOTE — BH TREATMENT PLAN - NSTXCOPEINTERPR_PSY_ALL_CORE
Will be encouraged to attend group programming daily to gain insight and work on coping skills.
Will be encouraged to attend group programming daily to gain insight and work on coping skill development.

## 2024-07-01 NOTE — BH TREATMENT PLAN - ANXIETY/PANIC/FEAR NURSING INTERVENTIONS/RECOMMENDATIONS
Provide education about anxiety disorders  Administer anti-anxiety drugs as ordered  Encourage relaxation techniques, such as deep breathing  Educate the pt. on the importance of  healthy sleep habits   Reduce environmental stressors  Assist the pt. with identifying and challenging irrational thoughts  Goals should include a plan for coping with anxiety
Provide education about anxiety disorders.  Administer anti-anxiety drugs, as ordered.  Encourage relaxation techniques such as deep breathing.  Educate the client on the importance of sleep hygiene and reducing environmental stressors.  Assist the client with identifying and challenging irrational thoughts.  Goals and outcomes should include a plan for coping with anxious situations.

## 2024-07-01 NOTE — BH TREATMENT PLAN - NSTXCOPEINTERRN_PSY_ALL_CORE
Provide a safe environment  Encourage pt. to verbalize feelings  Provide emotional support as needed   Encourage pt. to use coping skills in times of distress   Administer medications as prescribed   Provide education on forming/managing coping skills

## 2024-07-01 NOTE — BH TREATMENT PLAN - NSTXPLANTHERAPYSESSIONSFT_PSY_ALL_CORE
06-25-24  Type of therapy: N/A  Type of session: N/A  Level of patient participation: Resistance to participation  Duration of participation: 0  Therapy conducted by: Psych rehab  Therapy Summary: Patient did not attend group programming.    06-30-24  Type of therapy: N/A  Type of session: N/A  Level of patient participation: Resistance to participation  Duration of participation: 0  Therapy conducted by: N/A  Therapy Summary: Patient did not attend any therapy groups offered today despite encouragement.

## 2024-07-01 NOTE — BH TREATMENT PLAN - NSTXANXINTERMD_PSY_ALL_CORE
Anxiety/Depression with Passive SI with A/H: On Seroquel trial, now increased to 200 mg HS with Wellbutrin 37.5 mg Daily. Fair to good sleep now. A/H are less but still there. Trazodone 75 mg HS PRN insomnia
Anxiety/Depression with Passive SI with A/H: On Seroquel trial, now increased to 200 mg HS with Wellbutrin 37.5 mg Daily. Fair to good sleep now. A/H are less but still there. Trazodone 75 mg HS PRN insomnia  Psychosis-Responded to Risperdal 1 mg BID trial. She had a better sleep from Seroquel trial, but Seroquel trial of 300 mg Daily need to reduce to 200 mg HS for now. Depression improving on Wellbutrin  mg Trial.

## 2024-07-01 NOTE — BH INPATIENT PSYCHIATRY PROGRESS NOTE - NSBHFUPINTERVALHXFT_PSY_A_CORE
07/01/2024: Patient was seen today AM, chart reviewed and discussed in team. He has on and off A/h till now, and takes Prolixin PRN to help decrease A/H and  was suggested to have Prolixin Decanoate, which he agreed to take today, to give Prolixin Decanoate 50 mg IM today. Writer also called the [facility]  for further suggestion/clarification about LAMBERT of Prolixin as it was mentioned in chart that he was on Prolixin Decanoate 62.5 mg IM /4 weeks.    06/28/2024: Patient was seen tody AM, chart reviewed and discussed in team. She endorses that she was given meds earlier last night, had a good sleep, her A/H are less and is up and about today AM, rather than spending time in bed. Risperdal further increased to 2 mg BID. To continue other meds for further stability/safety. Overall a better picture than yesterday.      06/27/2024: Patient was seen today AM, chart reviewed and discussed in team. She continues to hear voices, derogatory in nature, with passive SI, she had better sleep last night and added that she was given meds at 11 PM and was able to sleep from 4:30 AM till today afternoon. Risperdal added to help decrease her A/H will f/u in AM, Wellbutrin further increased to 150 mg Daily.    06/26/2024: Patient was seen today AM, chart reviewed and discussed in team. Patient endorses that she was not able to sleep at all last night, Seroquel was earlier increased to 200 mg HS yesterday and to Seroquel further increased to 300 mg HS for sleep/A-Hallucinations. Patient also depressed and need to have increased dosage of Wellbutrin to 75 mg daily for further improvement.    06/25/2024: Patient was seen today AM, chart reviewed and discussed in team. She endorses that she was not able to sleep so has been feeling depressed with passive SI. She added that she also hears voices, non-command in type and directing OK to continue. No prior Psych admission and currently depressed with A/H, Seroquel increased to 200 mg HS and added Wellbutrin 37.5 mg HS to help with mood. No drug abuse, U-tox was positive for Cannabis. To continue current meds, she has been sleeping 5 hours since she came here.    Patient states she feels depressed that she has suicidal thinking but denies any plan or intent to act upon them.  No specific plan.  Patient is seeking help.  She wants to know if her CAT scan of the brain showed anything.  She still is anxious and she still depressed.  Denies any auditory or visual donations.  Patient does not  have PI.  07/01/2024:     06/28/2024: Patient was seen tody AM, chart reviewed and discussed in team. She endorses that she was given meds earlier last night, had a good sleep, her A/H are less and is up and about today AM, rather than spending time in bed. Risperdal further increased to 2 mg BID. To continue other meds for further stability/safety. Overall a better picture than yesterday.      06/27/2024: Patient was seen today AM, chart reviewed and discussed in team. She continues to hear voices, derogatory in nature, with passive SI, she had better sleep last night and added that she was given meds at 11 PM and was able to sleep from 4:30 AM till today afternoon. Risperdal added to help decrease her A/H will f/u in AM, Wellbutrin further increased to 150 mg Daily.    06/26/2024: Patient was seen today AM, chart reviewed and discussed in team. Patient endorses that she was not able to sleep at all last night, Seroquel was earlier increased to 200 mg HS yesterday and to Seroquel further increased to 300 mg HS for sleep/A-Hallucinations. Patient also depressed and need to have increased dosage of Wellbutrin to 75 mg daily for further improvement.    06/25/2024: Patient was seen today AM, chart reviewed and discussed in team. She endorses that she was not able to sleep so has been feeling depressed with passive SI. She added that she also hears voices, non-command in type and directing OK to continue. No prior Psych admission and currently depressed with A/H, Seroquel increased to 200 mg HS and added Wellbutrin 37.5 mg HS to help with mood. No drug abuse, U-tox was positive for Cannabis. To continue current meds, she has been sleeping 5 hours since she came here.    Patient states she feels depressed that she has suicidal thinking but denies any plan or intent to act upon them.  No specific plan.  Patient is seeking help.  She wants to know if her CAT scan of the brain showed anything.  She still is anxious and she still depressed.  Denies any auditory or visual donations.  Patient does not  have PI.  07/01/2024: Patient was seen today AM, chart reviewed and discussed in team. She endorses that she feels better, a little depressed and  denied A/H at this time. She has good sleep from Seroquel but Seroquel dosages to be reduced as she responded to Risperdal trial well with helping to decrease A/H, Seroquel dosages decreased to 200 mg HS and to see her tomorrow for further care. She feels happy with her overall progress.    06/28/2024: Patient was seen tody AM, chart reviewed and discussed in team. She endorses that she was given meds earlier last night, had a good sleep, her A/H are less and is up and about today AM, rather than spending time in bed. Risperdal further increased to 2 mg BID. To continue other meds for further stability/safety. Overall a better picture than yesterday.      06/27/2024: Patient was seen today AM, chart reviewed and discussed in team. She continues to hear voices, derogatory in nature, with passive SI, she had better sleep last night and added that she was given meds at 11 PM and was able to sleep from 4:30 AM till today afternoon. Risperdal added to help decrease her A/H will f/u in AM, Wellbutrin further increased to 150 mg Daily.    06/26/2024: Patient was seen today AM, chart reviewed and discussed in team. Patient endorses that she was not able to sleep at all last night, Seroquel was earlier increased to 200 mg HS yesterday and to Seroquel further increased to 300 mg HS for sleep/A-Hallucinations. Patient also depressed and need to have increased dosage of Wellbutrin to 75 mg daily for further improvement.    06/25/2024: Patient was seen today AM, chart reviewed and discussed in team. She endorses that she was not able to sleep so has been feeling depressed with passive SI. She added that she also hears voices, non-command in type and directing OK to continue. No prior Psych admission and currently depressed with A/H, Seroquel increased to 200 mg HS and added Wellbutrin 37.5 mg HS to help with mood. No drug abuse, U-tox was positive for Cannabis. To continue current meds, she has been sleeping 5 hours since she came here.    Patient states she feels depressed that she has suicidal thinking but denies any plan or intent to act upon them.  No specific plan.  Patient is seeking help.  She wants to know if her CAT scan of the brain showed anything.  She still is anxious and she still depressed.  Denies any auditory or visual donations.  Patient does not  have PI.

## 2024-07-01 NOTE — BH TREATMENT PLAN - NSDCCRITERIA_PSY_ALL_CORE
pt with return of sleep quality , pt to deny any hallucination 
pt with return of sleep quality , pt to deny any hallucination

## 2024-07-01 NOTE — BH TREATMENT PLAN - NSTXDCOTHRINTERSW_PSY_ALL_CORE
SW met with pt in dayroom and introduce self as pt’s SW and reviewed role. Pt discussed her reason behind being admitted and that current regime of medication has been helpful to her sx. Pt reports still experiencing CAH but has not intent or plan to act on these thoughts. Pt gives consent for her dtr to be contacted, Feroz Susan [phone], and will be returning home to live with her when ready for discharge. SW informed psych MD that pt is still reporting that she is having CAH.
Psych Sw met with Pt to further assess and will update Pt information as provided by Pt.  Sw to continue to follow to plan for future d/c and assess needs.

## 2024-07-01 NOTE — BH TREATMENT PLAN - NSPTSTATEDGOAL_PSY_ALL_CORE
Pt eager to seek help and treatment , Pt agreeable to aftercare ref. Sw to further explore options
Pt eager to seek help and treatment , Pt agreeable to aftercare ref. Sw to further explore options

## 2024-07-01 NOTE — BH TREATMENT PLAN - NSTXPLANTYPE_PSY_ALL_CORE
Message from MyChart:  Kiah Talbot would like a refill of the following medications:     amphetamine-dextroamphetamine (ADDERALL) 30 MG tablet [Gomez Soriano MD]    Preferred pharmacy: [id number] - [place], [place] - [facility]  Delivery method: Pickup  Preferred pick-up date and time: 6/20/2018       
Ongoing
Initial

## 2024-07-02 PROCEDURE — 99232 SBSQ HOSP IP/OBS MODERATE 35: CPT

## 2024-07-02 RX ORDER — ZOLPIDEM TARTRATE 10 MG
5 TABLET ORAL AT BEDTIME
Refills: 0 | Status: DISCONTINUED | OUTPATIENT
Start: 2024-07-02 | End: 2024-07-08

## 2024-07-02 RX ADMIN — Medication 5 MILLIGRAM(S): at 21:15

## 2024-07-02 RX ADMIN — LIDOCAINE HCL 1 PATCH: 28 GEL TOPICAL at 21:00

## 2024-07-02 RX ADMIN — Medication 20 MILLIGRAM(S): at 08:52

## 2024-07-02 RX ADMIN — RISPERIDONE 1 MILLIGRAM(S): 0.5 TABLET ORAL at 08:51

## 2024-07-02 RX ADMIN — LIDOCAINE HCL 1 PATCH: 28 GEL TOPICAL at 08:52

## 2024-07-02 RX ADMIN — LISINOPRIL 10 MILLIGRAM(S): 5 TABLET ORAL at 08:52

## 2024-07-02 RX ADMIN — CLONAZEPAM 0.5 MILLIGRAM(S): 2 TABLET ORAL at 08:52

## 2024-07-02 RX ADMIN — RISPERIDONE 1 MILLIGRAM(S): 0.5 TABLET ORAL at 21:16

## 2024-07-02 RX ADMIN — CLONAZEPAM 0.5 MILLIGRAM(S): 2 TABLET ORAL at 21:14

## 2024-07-02 RX ADMIN — TRAZODONE HYDROCHLORIDE 50 MILLIGRAM(S): 50 TABLET, FILM COATED ORAL at 21:15

## 2024-07-02 RX ADMIN — BUPROPION HYDROCHLORIDE 150 MILLIGRAM(S): 150 TABLET, EXTENDED RELEASE ORAL at 08:52

## 2024-07-02 RX ADMIN — LIDOCAINE HCL 1 PATCH: 28 GEL TOPICAL at 20:30

## 2024-07-02 NOTE — BH INPATIENT PSYCHIATRY PROGRESS NOTE - NSBHFUPINTERVALHXFT_PSY_A_CORE
07/02/2024: Patient was seen today AM, chart reviewed and discussed in team. Patient endorses that she was not able to sleep last night due to lowering the Seroquel and added that all her life she struggled with sleep, Ambien 5 mg HS standing ordered for sleep, if no sleep in 30 minutes to give additional PRN Ambien 5 mg HS for sleep. She has decreased a/h and mood seems ok at this time. To f/u in AM tomorrow.    07/01/2024: Patient was seen today AM, chart reviewed and discussed in team. She endorses that she feels better, a little depressed and  denied A/H at this time. She has good sleep from Seroquel but Seroquel dosages to be reduced as she responded to Risperdal trial well with helping to decrease A/H, Seroquel dosages decreased to 200 mg HS and to see her tomorrow for further care. She feels happy with her overall progress.    06/28/2024: Patient was seen tody AM, chart reviewed and discussed in team. She endorses that she was given meds earlier last night, had a good sleep, her A/H are less and is up and about today AM, rather than spending time in bed. Risperdal further increased to 2 mg BID. To continue other meds for further stability/safety. Overall a better picture than yesterday.      06/27/2024: Patient was seen today AM, chart reviewed and discussed in team. She continues to hear voices, derogatory in nature, with passive SI, she had better sleep last night and added that she was given meds at 11 PM and was able to sleep from 4:30 AM till today afternoon. Risperdal added to help decrease her A/H will f/u in AM, Wellbutrin further increased to 150 mg Daily.    06/26/2024: Patient was seen today AM, chart reviewed and discussed in team. Patient endorses that she was not able to sleep at all last night, Seroquel was earlier increased to 200 mg HS yesterday and to Seroquel further increased to 300 mg HS for sleep/A-Hallucinations. Patient also depressed and need to have increased dosage of Wellbutrin to 75 mg daily for further improvement.    06/25/2024: Patient was seen today AM, chart reviewed and discussed in team. She endorses that she was not able to sleep so has been feeling depressed with passive SI. She added that she also hears voices, non-command in type and directing OK to continue. No prior Psych admission and currently depressed with A/H, Seroquel increased to 200 mg HS and added Wellbutrin 37.5 mg HS to help with mood. No drug abuse, U-tox was positive for Cannabis. To continue current meds, she has been sleeping 5 hours since she came here.    Patient states she feels depressed that she has suicidal thinking but denies any plan or intent to act upon them.  No specific plan.  Patient is seeking help.  She wants to know if her CAT scan of the brain showed anything.  She still is anxious and she still depressed.  Denies any auditory or visual donations.  Patient does not  have PI.

## 2024-07-03 PROCEDURE — 99232 SBSQ HOSP IP/OBS MODERATE 35: CPT

## 2024-07-03 RX ORDER — RISPERIDONE 0.5 MG/1
2 TABLET ORAL
Refills: 0 | Status: DISCONTINUED | OUTPATIENT
Start: 2024-07-03 | End: 2024-07-08

## 2024-07-03 RX ADMIN — Medication 5 MILLIGRAM(S): at 21:10

## 2024-07-03 RX ADMIN — CLONAZEPAM 0.5 MILLIGRAM(S): 2 TABLET ORAL at 08:11

## 2024-07-03 RX ADMIN — TRAZODONE HYDROCHLORIDE 50 MILLIGRAM(S): 50 TABLET, FILM COATED ORAL at 21:10

## 2024-07-03 RX ADMIN — BUPROPION HYDROCHLORIDE 150 MILLIGRAM(S): 150 TABLET, EXTENDED RELEASE ORAL at 08:12

## 2024-07-03 RX ADMIN — Medication 20 MILLIGRAM(S): at 08:12

## 2024-07-03 RX ADMIN — RISPERIDONE 1 MILLIGRAM(S): 0.5 TABLET ORAL at 08:11

## 2024-07-03 RX ADMIN — RISPERIDONE 2 MILLIGRAM(S): 0.5 TABLET ORAL at 21:09

## 2024-07-03 RX ADMIN — CLONAZEPAM 0.5 MILLIGRAM(S): 2 TABLET ORAL at 21:09

## 2024-07-03 RX ADMIN — LISINOPRIL 10 MILLIGRAM(S): 5 TABLET ORAL at 08:12

## 2024-07-03 NOTE — BH INPATIENT PSYCHIATRY PROGRESS NOTE - OTHER
pt claiming that she is seeing "shadows" and hearing a female voice commenting on her behavior

## 2024-07-03 NOTE — BH INPATIENT PSYCHIATRY PROGRESS NOTE - NSBHFUPINTERVALHXFT_PSY_A_CORE
07/03/2024: Patient was seen today AM, chrat reviewed and discussed in team. She endorses that she still hears voices, low in quantity, mostly hears at night, had a better sleep last night after she got both Trazodone 50 mg HS with Ambien 5 mg HS. She would like to speak with SW for her issues. discharge planning next week.    07/02/2024: Patient was seen today AM, chart reviewed and discussed in team. Patient endorses that she was not able to sleep last night due to lowering the Seroquel and added that all her life she struggled with sleep, Ambien 5 mg HS standing ordered for sleep, if no sleep in 30 minutes to give additional PRN Ambien 5 mg HS for sleep. She has decreased a/h and mood seems ok at this time. To f/u in AM tomorrow.    07/01/2024: Patient was seen today AM, chart reviewed and discussed in team. She endorses that she feels better, a little depressed and  denied A/H at this time. She has good sleep from Seroquel but Seroquel dosages to be reduced as she responded to Risperdal trial well with helping to decrease A/H, Seroquel dosages decreased to 200 mg HS and to see her tomorrow for further care. She feels happy with her overall progress.    06/28/2024: Patient was seen tody AM, chart reviewed and discussed in team. She endorses that she was given meds earlier last night, had a good sleep, her A/H are less and is up and about today AM, rather than spending time in bed. Risperdal further increased to 2 mg BID. To continue other meds for further stability/safety. Overall a better picture than yesterday.      06/27/2024: Patient was seen today AM, chart reviewed and discussed in team. She continues to hear voices, derogatory in nature, with passive SI, she had better sleep last night and added that she was given meds at 11 PM and was able to sleep from 4:30 AM till today afternoon. Risperdal added to help decrease her A/H will f/u in AM, Wellbutrin further increased to 150 mg Daily.    06/26/2024: Patient was seen today AM, chart reviewed and discussed in team. Patient endorses that she was not able to sleep at all last night, Seroquel was earlier increased to 200 mg HS yesterday and to Seroquel further increased to 300 mg HS for sleep/A-Hallucinations. Patient also depressed and need to have increased dosage of Wellbutrin to 75 mg daily for further improvement.    06/25/2024: Patient was seen today AM, chart reviewed and discussed in team. She endorses that she was not able to sleep so has been feeling depressed with passive SI. She added that she also hears voices, non-command in type and directing OK to continue. No prior Psych admission and currently depressed with A/H, Seroquel increased to 200 mg HS and added Wellbutrin 37.5 mg HS to help with mood. No drug abuse, U-tox was positive for Cannabis. To continue current meds, she has been sleeping 5 hours since she came here.    Patient states she feels depressed that she has suicidal thinking but denies any plan or intent to act upon them.  No specific plan.  Patient is seeking help.  She wants to know if her CAT scan of the brain showed anything.  She still is anxious and she still depressed.  Denies any auditory or visual donations.  Patient does not  have PI.

## 2024-07-04 RX ADMIN — LIDOCAINE HCL 1 PATCH: 28 GEL TOPICAL at 10:56

## 2024-07-04 RX ADMIN — CLONAZEPAM 0.5 MILLIGRAM(S): 2 TABLET ORAL at 09:16

## 2024-07-04 RX ADMIN — LISINOPRIL 10 MILLIGRAM(S): 5 TABLET ORAL at 09:15

## 2024-07-04 RX ADMIN — RISPERIDONE 1 MILLIGRAM(S): 0.5 TABLET ORAL at 09:15

## 2024-07-04 RX ADMIN — CLONAZEPAM 0.5 MILLIGRAM(S): 2 TABLET ORAL at 20:13

## 2024-07-04 RX ADMIN — RISPERIDONE 2 MILLIGRAM(S): 0.5 TABLET ORAL at 20:13

## 2024-07-04 RX ADMIN — TRAZODONE HYDROCHLORIDE 50 MILLIGRAM(S): 50 TABLET, FILM COATED ORAL at 20:13

## 2024-07-04 RX ADMIN — Medication 20 MILLIGRAM(S): at 09:16

## 2024-07-04 RX ADMIN — Medication 5 MILLIGRAM(S): at 20:13

## 2024-07-04 RX ADMIN — BUPROPION HYDROCHLORIDE 150 MILLIGRAM(S): 150 TABLET, EXTENDED RELEASE ORAL at 09:16

## 2024-07-04 RX ADMIN — Medication 5 MILLIGRAM(S): at 20:40

## 2024-07-05 PROCEDURE — 99232 SBSQ HOSP IP/OBS MODERATE 35: CPT

## 2024-07-05 RX ADMIN — CLONAZEPAM 0.5 MILLIGRAM(S): 2 TABLET ORAL at 20:15

## 2024-07-05 RX ADMIN — LIDOCAINE HCL 1 PATCH: 28 GEL TOPICAL at 09:16

## 2024-07-05 RX ADMIN — RISPERIDONE 2 MILLIGRAM(S): 0.5 TABLET ORAL at 20:15

## 2024-07-05 RX ADMIN — Medication 5 MILLIGRAM(S): at 20:36

## 2024-07-05 RX ADMIN — CLONAZEPAM 0.5 MILLIGRAM(S): 2 TABLET ORAL at 09:11

## 2024-07-05 RX ADMIN — TRAZODONE HYDROCHLORIDE 50 MILLIGRAM(S): 50 TABLET, FILM COATED ORAL at 20:15

## 2024-07-05 RX ADMIN — Medication 20 MILLIGRAM(S): at 09:11

## 2024-07-05 RX ADMIN — RISPERIDONE 1 MILLIGRAM(S): 0.5 TABLET ORAL at 09:11

## 2024-07-05 RX ADMIN — LISINOPRIL 10 MILLIGRAM(S): 5 TABLET ORAL at 09:11

## 2024-07-05 RX ADMIN — BUPROPION HYDROCHLORIDE 150 MILLIGRAM(S): 150 TABLET, EXTENDED RELEASE ORAL at 09:11

## 2024-07-05 RX ADMIN — Medication 5 MILLIGRAM(S): at 20:15

## 2024-07-05 NOTE — BH INPATIENT PSYCHIATRY PROGRESS NOTE - NSBHFUPINTERVALHXFT_PSY_A_CORE
07/05/2024: Patient was seen today AM, chart reviewed and discussed in team. Patient is visible in community, has good sleep/appetite, no aggression or agitation reported. She has good sleep, her a/h are gone since Risperdal was increased to 3 mg/day. She seems hopeful and would like to go home now. She is no longer depressed, has no SI now and more visible in community and to be discharged early coming week. No meds changes at this time.    07/03/2024: Patient was seen today AM, chart reviewed and discussed in team. She endorses that she still hears voices, low in quantity, mostly hears at night, had a better sleep last night after she got both Trazodone 50 mg HS with Ambien 5 mg HS. She would like to speak with SW for her issues. discharge planning next week.    07/02/2024: Patient was seen today AM, chart reviewed and discussed in team. Patient endorses that she was not able to sleep last night due to lowering the Seroquel and added that all her life she struggled with sleep, Ambien 5 mg HS standing ordered for sleep, if no sleep in 30 minutes to give additional PRN Ambien 5 mg HS for sleep. She has decreased a/h and mood seems ok at this time. To f/u in AM tomorrow.    07/01/2024: Patient was seen today AM, chart reviewed and discussed in team. She endorses that she feels better, a little depressed and  denied A/H at this time. She has good sleep from Seroquel but Seroquel dosages to be reduced as she responded to Risperdal trial well with helping to decrease A/H, Seroquel dosages decreased to 200 mg HS and to see her tomorrow for further care. She feels happy with her overall progress.    06/28/2024: Patient was seen tody AM, chart reviewed and discussed in team. She endorses that she was given meds earlier last night, had a good sleep, her A/H are less and is up and about today AM, rather than spending time in bed. Risperdal further increased to 2 mg BID. To continue other meds for further stability/safety. Overall a better picture than yesterday.      06/27/2024: Patient was seen today AM, chart reviewed and discussed in team. She continues to hear voices, derogatory in nature, with passive SI, she had better sleep last night and added that she was given meds at 11 PM and was able to sleep from 4:30 AM till today afternoon. Risperdal added to help decrease her A/H will f/u in AM, Wellbutrin further increased to 150 mg Daily.    06/26/2024: Patient was seen today AM, chart reviewed and discussed in team. Patient endorses that she was not able to sleep at all last night, Seroquel was earlier increased to 200 mg HS yesterday and to Seroquel further increased to 300 mg HS for sleep/A-Hallucinations. Patient also depressed and need to have increased dosage of Wellbutrin to 75 mg daily for further improvement.    06/25/2024: Patient was seen today AM, chart reviewed and discussed in team. She endorses that she was not able to sleep so has been feeling depressed with passive SI. She added that she also hears voices, non-command in type and directing OK to continue. No prior Psych admission and currently depressed with A/H, Seroquel increased to 200 mg HS and added Wellbutrin 37.5 mg HS to help with mood. No drug abuse, U-tox was positive for Cannabis. To continue current meds, she has been sleeping 5 hours since she came here.    Patient states she feels depressed that she has suicidal thinking but denies any plan or intent to act upon them.  No specific plan.  Patient is seeking help.  She wants to know if her CAT scan of the brain showed anything.  She still is anxious and she still depressed.  Denies any auditory or visual donations.  Patient does not  have PI.

## 2024-07-06 RX ORDER — ONDANSETRON HYDROCHLORIDE 2 MG/ML
4 INJECTION INTRAMUSCULAR; INTRAVENOUS ONCE
Refills: 0 | Status: DISCONTINUED | OUTPATIENT
Start: 2024-07-06 | End: 2024-07-06

## 2024-07-06 RX ORDER — ONDANSETRON HYDROCHLORIDE 2 MG/ML
4 INJECTION INTRAMUSCULAR; INTRAVENOUS ONCE
Refills: 0 | Status: COMPLETED | OUTPATIENT
Start: 2024-07-06 | End: 2024-07-06

## 2024-07-06 RX ADMIN — RISPERIDONE 1 MILLIGRAM(S): 0.5 TABLET ORAL at 10:03

## 2024-07-06 RX ADMIN — RISPERIDONE 2 MILLIGRAM(S): 0.5 TABLET ORAL at 20:25

## 2024-07-06 RX ADMIN — LIDOCAINE HCL 1 PATCH: 28 GEL TOPICAL at 10:03

## 2024-07-06 RX ADMIN — LISINOPRIL 10 MILLIGRAM(S): 5 TABLET ORAL at 10:04

## 2024-07-06 RX ADMIN — CLONAZEPAM 0.5 MILLIGRAM(S): 2 TABLET ORAL at 20:26

## 2024-07-06 RX ADMIN — CLONAZEPAM 0.5 MILLIGRAM(S): 2 TABLET ORAL at 10:04

## 2024-07-06 RX ADMIN — BUPROPION HYDROCHLORIDE 150 MILLIGRAM(S): 150 TABLET, EXTENDED RELEASE ORAL at 10:04

## 2024-07-06 RX ADMIN — Medication 20 MILLIGRAM(S): at 10:04

## 2024-07-06 RX ADMIN — ONDANSETRON HYDROCHLORIDE 4 MILLIGRAM(S): 2 INJECTION INTRAMUSCULAR; INTRAVENOUS at 22:19

## 2024-07-06 RX ADMIN — TRAZODONE HYDROCHLORIDE 50 MILLIGRAM(S): 50 TABLET, FILM COATED ORAL at 20:25

## 2024-07-06 RX ADMIN — Medication 5 MILLIGRAM(S): at 20:25

## 2024-07-07 RX ORDER — ONDANSETRON HYDROCHLORIDE 2 MG/ML
4 INJECTION INTRAMUSCULAR; INTRAVENOUS ONCE
Refills: 0 | Status: COMPLETED | OUTPATIENT
Start: 2024-07-07 | End: 2024-07-07

## 2024-07-07 RX ORDER — ONDANSETRON HYDROCHLORIDE 2 MG/ML
4 INJECTION INTRAMUSCULAR; INTRAVENOUS EVERY 8 HOURS
Refills: 0 | Status: DISCONTINUED | OUTPATIENT
Start: 2024-07-07 | End: 2024-07-07

## 2024-07-07 RX ORDER — ONDANSETRON HYDROCHLORIDE 2 MG/ML
4 INJECTION INTRAMUSCULAR; INTRAVENOUS EVERY 8 HOURS
Refills: 0 | Status: DISCONTINUED | OUTPATIENT
Start: 2024-07-07 | End: 2024-07-08

## 2024-07-07 RX ADMIN — LISINOPRIL 10 MILLIGRAM(S): 5 TABLET ORAL at 16:04

## 2024-07-07 RX ADMIN — CLONAZEPAM 0.5 MILLIGRAM(S): 2 TABLET ORAL at 08:47

## 2024-07-07 RX ADMIN — LIDOCAINE HCL 1 PATCH: 28 GEL TOPICAL at 20:58

## 2024-07-07 RX ADMIN — TRAZODONE HYDROCHLORIDE 50 MILLIGRAM(S): 50 TABLET, FILM COATED ORAL at 20:46

## 2024-07-07 RX ADMIN — CLONAZEPAM 0.5 MILLIGRAM(S): 2 TABLET ORAL at 20:46

## 2024-07-07 RX ADMIN — LIDOCAINE HCL 1 PATCH: 28 GEL TOPICAL at 20:59

## 2024-07-07 RX ADMIN — RISPERIDONE 2 MILLIGRAM(S): 0.5 TABLET ORAL at 20:46

## 2024-07-07 RX ADMIN — BUPROPION HYDROCHLORIDE 150 MILLIGRAM(S): 150 TABLET, EXTENDED RELEASE ORAL at 08:46

## 2024-07-07 RX ADMIN — Medication 5 MILLIGRAM(S): at 23:12

## 2024-07-07 RX ADMIN — RISPERIDONE 1 MILLIGRAM(S): 0.5 TABLET ORAL at 08:46

## 2024-07-07 RX ADMIN — Medication 5 MILLIGRAM(S): at 20:47

## 2024-07-07 RX ADMIN — ONDANSETRON HYDROCHLORIDE 4 MILLIGRAM(S): 2 INJECTION INTRAMUSCULAR; INTRAVENOUS at 16:05

## 2024-07-07 RX ADMIN — LIDOCAINE HCL 1 PATCH: 28 GEL TOPICAL at 08:47

## 2024-07-07 NOTE — BH DISCHARGE NOTE NURSING/SOCIAL WORK/PSYCH REHAB - PATIENT PORTAL LINK FT
You can access the FollowMyHealth Patient Portal offered by [facility] by registering at the following website: [email or web address]. By joining [facility]’s FollowMyHealth portal, you will also be able to view your health information using other applications (apps) compatible with our system.

## 2024-07-07 NOTE — BH DISCHARGE NOTE NURSING/SOCIAL WORK/PSYCH REHAB - NSDCADDINFO1FT_PSY_ALL_CORE
You have a TELEHEALTH appt with therapist Sury Mckenna LMSW on 7/10 @ 3pm. The link will be texted to your phone to login to your session. Please  call the number above if you need to reschedule.  You have a TELEHEALTH appt with therapist Sury Mckenna LMSW on 7/10 @ 3pm. The link will be texted to your phone to login to your session. Please  call the number above if you need to reschedule or experience difficulties with logging into your session.

## 2024-07-07 NOTE — BH DISCHARGE NOTE NURSING/SOCIAL WORK/PSYCH REHAB - NSBHCRISISRESOURCESOTHER_PSY_ALL_CORE_FT
Please contact Dr. Villanueva for medication or treatment questions, lab results or any other concerns at [phone]. Handoff provided to your outpatient provider, [facility].  If needed, please contact [facility], [facility], 24/7 days a week and speak with Delicia Hamm RN Nurse manager or any [facility] staff member @ [phone].  Please return to the ED if symptoms worsen or return.

## 2024-07-07 NOTE — BH DISCHARGE NOTE NURSING/SOCIAL WORK/PSYCH REHAB - NSDCPRGOAL_PSY_ALL_CORE
Patient worked on safety planning and coping skill development through participation in unit programming.

## 2024-07-07 NOTE — BH DISCHARGE NOTE NURSING/SOCIAL WORK/PSYCH REHAB - NSCDUDCCRISIS_PSY_A_CORE
[facility] Well  [phone]) [facility]-WELL ([phone])  Text "WELL" to 64859  Website: www.[employer]/.[facility] 1 (472) 621-HOPE (7777) Website: www.safehorizon.org/.  [facility] – Crisis Care for Children, Adults and Families  [address] [place] [address]  Mobile Crisis Hotline – (732) 645-2292/.National Suicide Prevention Lifeline 7 (101) 110-1114/.  [facility]  1 (679) LIFENET (660-6168)/.  [facility]  (171) 609-6565/.  [facility] Behavioral Health Helpline / [facility] Mobile Crisis  (061) 442-OWBT (9208)/.  [facility] Response Crisis Hotline  (717) 649-7668  24 hour telephone crisis intervention and suicide prevention hotline concerned with all mental health issues/.  [facility]  [address] [place] [address] [phone]   Hours:  Monday through Friday from 9 AM to 3 PM/Other.../988 Suicide and Crisis Lifeline

## 2024-07-08 VITALS — OXYGEN SATURATION: 100 % | TEMPERATURE: 98 F | RESPIRATION RATE: 16 BRPM

## 2024-07-08 PROCEDURE — 99239 HOSP IP/OBS DSCHRG MGMT >30: CPT

## 2024-07-08 RX ORDER — TRAZODONE HYDROCHLORIDE 50 MG/1
1 TABLET, FILM COATED ORAL
Qty: 30 | Refills: 0
Start: 2024-07-08 | End: 2024-08-06

## 2024-07-08 RX ORDER — HYDROCHLOROTHIAZIDE 25 MG
1 TABLET ORAL
Qty: 30 | Refills: 0
Start: 2024-07-08 | End: 2024-08-06

## 2024-07-08 RX ORDER — BUPROPION HYDROCHLORIDE 150 MG/1
1 TABLET, EXTENDED RELEASE ORAL
Qty: 30 | Refills: 0
Start: 2024-07-08 | End: 2024-08-06

## 2024-07-08 RX ORDER — ZOLPIDEM TARTRATE 10 MG
1 TABLET ORAL
Qty: 30 | Refills: 0
Start: 2024-07-08 | End: 2024-08-06

## 2024-07-08 RX ORDER — RISPERIDONE 0.5 MG/1
1 TABLET ORAL
Qty: 30 | Refills: 0
Start: 2024-07-08 | End: 2024-08-06

## 2024-07-08 RX ORDER — LISINOPRIL 5 MG/1
1 TABLET ORAL
Qty: 30 | Refills: 0
Start: 2024-07-08 | End: 2024-08-06

## 2024-07-08 RX ORDER — LISINOPRIL AND HYDROCHLOROTHIAZIDE 25; 20 MG/1; MG/1
1 TABLET ORAL
Refills: 0 | DISCHARGE

## 2024-07-08 RX ORDER — FAMOTIDINE 40 MG
1 TABLET ORAL
Qty: 30 | Refills: 0
Start: 2024-07-08 | End: 2024-08-06

## 2024-07-08 RX ORDER — LIDOCAINE HCL 28 MG/G
1 GEL TOPICAL
Qty: 30 | Refills: 0
Start: 2024-07-08 | End: 2024-08-06

## 2024-07-08 RX ADMIN — BUPROPION HYDROCHLORIDE 150 MILLIGRAM(S): 150 TABLET, EXTENDED RELEASE ORAL at 09:02

## 2024-07-08 RX ADMIN — RISPERIDONE 1 MILLIGRAM(S): 0.5 TABLET ORAL at 09:02

## 2024-07-08 NOTE — BH INPATIENT PSYCHIATRY DISCHARGE NOTE - HPI (INCLUDE ILLNESS QUALITY, SEVERITY, DURATION, TIMING, CONTEXT, MODIFYING FACTORS, ASSOCIATED SIGNS AND SYMPTOMS)
Pt is a 48yo woman, , lives with daughter and 2 granddaughters, recently on leave from work as , with no formal pph, no prior admissions, no current outpt, no past reported suicide attempts, denies substance use other than trying cannabis for the anxiety recently, and pmh of HTN and back pain. She self presents for worsening mood, anxiety, and thoughts of death.     Patient reports worsening insomnia, depressed mood, fatigue, anger, low frustration tolerance, and self harm by hitting her head with her hands for the past month. Some of the symptoms started a year ago however acutely progressed in the last month. She feels she is having a nervous breakdown and not herself. She describes sleeping avg 2 hours for the past 3 years, yet in the last month has increased energy. Pt also endorses thoughts of death, more so passive SI, no active. In her frustration, she yells and slaps the sides of her head with her hands. She describes distractibility and racing thoughts, however denies any grandiosity, delusions, elevated mood, or increased goal directed behavior. There are no known inciting triggers or stressors; she attributes her symptoms mostly to lack of sleep. In the past, she used benadryl and then went to PMD who prescribed her xanax. She says the xanax is ineffective and makes her jittery. She feels paranoid, that others are talking about her at work, but also states she knows its not true. Pt desecribes seeing "blurriness", shadowy things and hears voices telling her to relax for the past 3 weeks. They are noncommand and nonpersecutory in nature. Denies HI or prior SAs. Pt is seeking voluntary admission.     See  note for collateral info from daughter    [employer] Reference #: [id number]     B	N	Y	B	06/13/2024	06/15/2024	alprazolam 0.5 mg tablet	60	30	Meghann Lam	[id number]	Medicaid	[id number]  B	N	Y		06/13/2024	06/15/2024	zolpidem tart er 12.5 mg tab	14	14	Meghann Lam	[id number]	Medicaid	[id number]  B	N	N		06/01/2024	06/01/2024	eszopiclone 3 mg tablet	14	14	Carole Meghann	[id number]	Medicaid	[id number]  B	N	N		05/29/2024	05/30/2024	eszopiclone 1 mg tablet	3	3	Lam, Meghann	[id number]	Medicaid	[id number]  B	N	N	B	05/28/2024	05/28/2024	alprazolam 0.5 mg tablet	28	14	Lam, Meghann	[id number]	Medicaid	[id number]  B	N	N	B	05/28/2024	05/28/2024	temazepam 7.5 mg capsule	7	7	Lam, Meghann	[id number]	[id number]  B	N	N		04/16/2024	04/26/2024	zolpidem tart er 12.5 mg tab	30	30	Lam, Meghann	[id number]	[id number]  B	N	N	B	04/16/2024	04/16/2024	alprazolam 0.25 mg tablet	7	7	Lam, Meghann	[id number]	[id number]

## 2024-07-08 NOTE — BH INPATIENT PSYCHIATRY PROGRESS NOTE - NSTXDCOTHRDATEEST_PSY_ALL_CORE
02-Jul-2024
22-Jun-2024
28-Jun-2024
02-Jul-2024
22-Jun-2024
02-Jul-2024
22-Jun-2024
28-Jun-2024
22-Jun-2024
05-Jul-2024
22-Jun-2024

## 2024-07-08 NOTE — BH INPATIENT PSYCHIATRY PROGRESS NOTE - NSTXCOPEDATEEST_PSY_ALL_CORE
26-Jun-2024
08-Jul-2024
24-Jun-2024
30-Jun-2024
30-Jun-2024
26-Jun-2024
30-Jun-2024
26-Jun-2024
24-Jun-2024
30-Jun-2024

## 2024-07-08 NOTE — BH INPATIENT PSYCHIATRY PROGRESS NOTE - NSBHCONSDANGERSELF_PSY_A_CORE
suicidal ideation with plan and means
suicidal behavior/suicidal ideation with plan and means

## 2024-07-08 NOTE — BH INPATIENT PSYCHIATRY PROGRESS NOTE - NSBHASSESSSUMMFT_PSY_ALL_CORE
low risk as the pt denies any suicidal ideation intent or plan , pt reported she finally had some sleep and willing to stay on the seroquel   mitigating pt on medication   protective pt with place to live, is employed   chronic pt with delusions, hallucinations     6/24/24:   Patient continues to There was depression and suicidality but no plan or intent to harm himself.    The CAT scan of the head is normal and there is communicated with patient.  Reportedly she told the nurse that she has asthma but no medication was reported in the last 4 days since she was in the hospital, hospitalist did not order anything and no medication for last night is listed in her home medication.    Will continue current treatment as per treatment team.      
low risk as the pt denies any suicidal ideation intent or plan , pt reported she finally had some sleep and willing to stay on the seroquel   mitigating pt on medication   protective pt with place to live, is employed   chronic pt with delusions, hallucinations   
low risk as the pt denies any suicidal ideation intent or plan , pt reported she finally had some sleep and willing to stay on the seroquel   mitigating pt on medication   protective pt with place to live, is employed   chronic pt with delusions, hallucinations     6/24/24:   Patient continues to There was depression and suicidality but no plan or intent to harm himself.    The CAT scan of the head is normal and there is communicated with patient.  Reportedly she told the nurse that she has asthma but no medication was reported in the last 4 days since she was in the hospital, hospitalist did not order anything and no medication for last night is listed in her home medication.    Will continue current treatment as per treatment team.      
07/08/2024: Patient following  admission, did very well on meds given for stability/safety. She denied any A/V/H or Paranoia now and has fair to good sleep/appetite. Seroquel was started but had to be taken off as her A/H were not being controlled with Seroquel so she was given Risperdal and at 3 mg trial a day she seems to be in good control for A/H and started to have improved sleep and appetite. To continue meds as given here in the unit for stability    low risk as the pt denies any suicidal ideation intent or plan , pt reported she finally had some sleep and willing to stay on the Seroquel   mitigating pt on medication   protective pt with place to live, is employed   chronic pt with delusions, hallucinations     6/24/24:   Patient continues to There was depression and suicidality but no plan or intent to harm himself.    The CAT scan of the head is normal and there is communicated with patient.  Reportedly she told the nurse that she has asthma but no medication was reported in the last 4 days since she was in the hospital, hospitalist did not order anything and no medication for last night is listed in her home medication.    Will continue current treatment as per treatment team.      
low risk as the pt denies any suicidal ideation intent or plan , pt reported she finally had some sleep and willing to stay on the seroquel   mitigating pt on medication   protective pt with place to live, is employed   chronic pt with delusions, hallucinations     6/24/24:   Patient continues to There was depression and suicidality but no plan or intent to harm himself.    The CAT scan of the head is normal and there is communicated with patient.  Reportedly she told the nurse that she has asthma but no medication was reported in the last 4 days since she was in the hospital, hospitalist did not order anything and no medication for last night is listed in her home medication.    Will continue current treatment as per treatment team.

## 2024-07-08 NOTE — BH INPATIENT PSYCHIATRY PROGRESS NOTE - NSTXANXINTERMD_PSY_ALL_CORE
Anxiety/Depression with Passive SI with A/H: On Seroquel trial, now increased to 200 mg HS with Wellbutrin 37.5 mg Daily. Fair to good sleep now. A/H are less but still there. Trazodone 75 mg HS PRN insomnia  Psychosis-Responded to Risperdal 1 mg BID trial. She had a better sleep from Seroquel trial, but Seroquel trial of 300 mg Daily need to reduce to 200 mg HS for now. Depression improving on Wellbutrin  mg Trial.	
Anxiety/Depression with Passive SI with A/H: On Seroquel trial, now increased to 200 mg HS with Wellbutrin 37.5 mg Daily. Fair to good sleep now. A/H are less but still there. Trazodone 75 mg HS PRN insomnia
Anxiety/Depression with Passive SI with A/H: On Seroquel trial, now increased to 200 mg HS with Wellbutrin 37.5 mg Daily. Fair to good sleep now. A/H are less but still there. Trazodone 75 mg HS PRN insomnia  Psychosis-Responded to Risperdal 1 mg BID trial. She had a better sleep from Seroquel trial, but Seroquel trial of 300 mg Daily need to reduce to 200 mg HS for now. Depression improving on Wellbutrin  mg Trial.	

## 2024-07-08 NOTE — BH INPATIENT PSYCHIATRY PROGRESS NOTE - NSTXCOPEINTERMD_PSY_ALL_CORE
Anxiety/Depression with Passive SI with A/H: On Seroquel trial, now increased to 200 mg HS with Wellbutrin 37.5 mg Daily. Fair to good sleep now. A/H are less but still there. Trazodone 75 mg HS PRN insomnia  Psychosis-Responded to Risperdal 1 mg BID trial. She had a better sleep from Seroquel trial, but Seroquel trial of 300 mg Daily need to reduce to 200 mg HS for now. Depression improving on Wellbutrin  mg Trial.	
Anxiety/Depression with Passive SI with A/H: On Seroquel trial, now increased to 200 mg HS with Wellbutrin 37.5 mg Daily. Fair to good sleep now. A/H are less but still there. Trazodone 75 mg HS PRN insomnia  Psychosis-Responded to Risperdal 1 mg BID trial. She had a better sleep from Seroquel trial, but Seroquel trial of 300 mg Daily need to reduce to 200 mg HS for now. Depression improving on Wellbutrin  mg Trial.	
Anxiety/Depression with Passive SI with A/H: On Seroquel trial, now increased to 200 mg HS with Wellbutrin 37.5 mg Daily. Fair to good sleep now. A/H are less but still there. Trazodone 75 mg HS PRN insomnia
Anxiety/Depression with Passive SI with A/H: On Seroquel trial, now increased to 200 mg HS with Wellbutrin 37.5 mg Daily. Fair to good sleep now. A/H are less but still there. Trazodone 75 mg HS PRN insomnia  Psychosis-Responded to Risperdal 1 mg BID trial. She had a better sleep from Seroquel trial, but Seroquel trial of 300 mg Daily need to reduce to 200 mg HS for now. Depression improving on Wellbutrin  mg Trial.	
Anxiety/Depression with Passive SI with A/H: On Seroquel trial, now increased to 200 mg HS with Wellbutrin 37.5 mg Daily. Fair to good sleep now. A/H are less but still there. Trazodone 75 mg HS PRN insomnia
Anxiety/Depression with Passive SI with A/H: On Seroquel trial, now increased to 200 mg HS with Wellbutrin 37.5 mg Daily. Fair to good sleep now. A/H are less but still there. Trazodone 75 mg HS PRN insomnia  Psychosis-Responded to Risperdal 1 mg BID trial. She had a better sleep from Seroquel trial, but Seroquel trial of 300 mg Daily need to reduce to 200 mg HS for now. Depression improving on Wellbutrin  mg Trial.	
Anxiety/Depression with Passive SI with A/H: On Seroquel trial, now increased to 200 mg HS with Wellbutrin 37.5 mg Daily. Fair to good sleep now. A/H are less but still there. Trazodone 75 mg HS PRN insomnia
Anxiety/Depression with Passive SI with A/H: On Seroquel trial, now increased to 200 mg HS with Wellbutrin 37.5 mg Daily. Fair to good sleep now. A/H are less but still there. Trazodone 75 mg HS PRN insomnia

## 2024-07-08 NOTE — BH INPATIENT PSYCHIATRY DISCHARGE NOTE - NSBHMETABOLIC_PSY_ALL_CORE_FT
BMI: BMI (kg/m2): 32.8 (06-22-24 @ 02:29)  HbA1c: A1C with Estimated Average Glucose Result: 5.8 % (06-22-24 @ 07:56)    Glucose:   BP: --Vital Signs Last 24 Hrs  T(C): 36.5 (07-08-24 @ 07:46), Max: 36.5 (07-08-24 @ 07:46)  T(F): 97.7 (07-08-24 @ 07:46), Max: 97.7 (07-08-24 @ 07:46)  HR: --  BP: --  BP(mean): --  RR: 16 (07-08-24 @ 07:46) (16 - 16)  SpO2: 100% (07-08-24 @ 07:46) (100% - 100%)    Orthostatic VS  07-08-24 @ 07:46  Lying BP: --/-- HR: --  Sitting BP: 97/63 HR: 95  Standing BP: 91/59 HR: 100  Site: upper right arm  Mode: electronic  Orthostatic VS  07-07-24 @ 07:03  Lying BP: --/-- HR: --  Sitting BP: 113/77 HR: 86  Standing BP: 107/72 HR: 86  Site: upper right arm  Mode: electronic    Lipid Panel: Date/Time: 06-22-24 @ 07:56  Cholesterol, Serum: 205  LDL Cholesterol Calculated: 128  HDL Cholesterol, Serum: 57  Total Cholesterol/HDL Ration Measurement: --  Triglycerides, Serum: 109

## 2024-07-08 NOTE — BH INPATIENT PSYCHIATRY PROGRESS NOTE - NSTXANXGOAL_PSY_ALL_CORE
Report a reduction in panic attacks and improving mood and confidence

## 2024-07-08 NOTE — BH INPATIENT PSYCHIATRY PROGRESS NOTE - NSTXANXPROGRES_PSY_ALL_CORE
No Change
Improving
No Change
No Change
Improving
No Change
Improving
Drysol Counseling:  I discussed with the patient the risks of drysol/aluminum chloride including but not limited to skin rash, itching, irritation, burning.

## 2024-07-08 NOTE — BH INPATIENT PSYCHIATRY DISCHARGE NOTE - NSDCCPCAREPLAN_GEN_ALL_CORE_FT
PRINCIPAL DISCHARGE DIAGNOSIS  Diagnosis: Major depressive disorder, single episode, severe, with psychosis  Assessment and Plan of Treatment: Wellbutrin  mg daily; Risperdal 1mg AM and Risperdal 2 mg HS; Trazodone 50 mg HS and Ambien 5 mg HS      SECONDARY DISCHARGE DIAGNOSES  Diagnosis: Psychosis, unspecified psychosis type  Assessment and Plan of Treatment: Risperdal 1 mg AM and Risperdal 2 mg HS

## 2024-07-08 NOTE — BH INPATIENT PSYCHIATRY DISCHARGE NOTE - NSBHASSESSSUMMFT_PSY_ALL_CORE
07/08/2024: Patient following  admission, did very well on meds given for stability/safety. She denied any A/V/H or Paranoia now and has fair to good sleep/appetite. Seroquel was started but had to be taken off as her A/H were not being controlled with Seroquel so she was given Risperdal and at 3 mg trial a day she seems to be in good control for A/H and started to have improved sleep and appetite. To continue meds as given here in the unit for stability    low risk as the pt denies any suicidal ideation intent or plan , pt reported she finally had some sleep and willing to stay on the Seroquel   mitigating pt on medication   protective pt with place to live, is employed   chronic pt with delusions, hallucinations     6/24/24:   Patient continues to There was depression and suicidality but no plan or intent to harm himself.    The CAT scan of the head is normal and there is communicated with patient.  Reportedly she told the nurse that she has asthma but no medication was reported in the last 4 days since she was in the hospital, hospitalist did not order anything and no medication for last night is listed in her home medication.    Will continue current treatment as per treatment team.

## 2024-07-08 NOTE — BH INPATIENT PSYCHIATRY PROGRESS NOTE - NSBHCHARTREVIEWVS_PSY_A_CORE FT
Vital Signs Last 24 Hrs  T(C): 36.4 (06-28-24 @ 07:00), Max: 36.4 (06-28-24 @ 07:00)  T(F): 97.5 (06-28-24 @ 07:00), Max: 97.5 (06-28-24 @ 07:00)  HR: --  BP: --  BP(mean): --  RR: 16 (06-28-24 @ 07:00) (16 - 16)  SpO2: 100% (06-28-24 @ 07:00) (100% - 100%)    Orthostatic VS  06-28-24 @ 07:00  Lying BP: --/-- HR: --  Sitting BP: 98/78 HR: 89  Standing BP: 90/67 HR: 100  Site: upper right arm  Mode: electronic  Orthostatic VS  06-27-24 @ 07:40  Lying BP: --/-- HR: --  Sitting BP: 127/74 HR: 88  Standing BP: 113/68 HR: 95  Site: upper right arm  Mode: electronic  
Vital Signs Last 24 Hrs  T(C): 36.5 (07-08-24 @ 07:46), Max: 36.5 (07-08-24 @ 07:46)  T(F): 97.7 (07-08-24 @ 07:46), Max: 97.7 (07-08-24 @ 07:46)  HR: --  BP: --  BP(mean): --  RR: 16 (07-08-24 @ 07:46) (16 - 16)  SpO2: 100% (07-08-24 @ 07:46) (100% - 100%)    Orthostatic VS  07-08-24 @ 07:46  Lying BP: --/-- HR: --  Sitting BP: 97/63 HR: 95  Standing BP: 91/59 HR: 100  Site: upper right arm  Mode: electronic  Orthostatic VS  07-07-24 @ 07:03  Lying BP: --/-- HR: --  Sitting BP: 113/77 HR: 86  Standing BP: 107/72 HR: 86  Site: upper right arm  Mode: electronic  
Vital Signs Last 24 Hrs  T(C): 36.6 (07-01-24 @ 07:28), Max: 36.6 (07-01-24 @ 07:28)  T(F): 97.8 (07-01-24 @ 07:28), Max: 97.8 (07-01-24 @ 07:28)  HR: --  BP: --  BP(mean): --  RR: 16 (07-01-24 @ 07:28) (16 - 16)  SpO2: 98% (07-01-24 @ 07:28) (98% - 98%)    Orthostatic VS  07-01-24 @ 07:28  Lying BP: --/-- HR: --  Sitting BP: 121/80 HR: 102  Standing BP: 100/71 HR: 102  Site: upper right arm  Mode: electronic  Orthostatic VS  06-30-24 @ 07:14  Lying BP: --/-- HR: --  Sitting BP: 125/75 HR: 89  Standing BP: 107/81 HR: 97  Site: upper right arm  Mode: electronic  
Vital Signs Last 24 Hrs  T(C): 36.5 (06-25-24 @ 07:34), Max: 36.5 (06-25-24 @ 07:34)  T(F): 97.7 (06-25-24 @ 07:34), Max: 97.7 (06-25-24 @ 07:34)  HR: --  BP: --  BP(mean): --  RR: 16 (06-25-24 @ 07:34) (16 - 16)  SpO2: 100% (06-25-24 @ 07:34) (100% - 100%)    Orthostatic VS  06-25-24 @ 07:34  Lying BP: --/-- HR: --  Sitting BP: 117/74 HR: 100  Standing BP: 107/82 HR: 100  Site: upper right arm  Mode: electronic  Orthostatic VS  06-24-24 @ 07:19  Lying BP: --/-- HR: --  Sitting BP: 103/88 HR: 100  Standing BP: 124/61 HR: 102  Site: upper right arm  Mode: electronic  
Vital Signs Last 24 Hrs  T(C): 36.6 (06-27-24 @ 07:40), Max: 36.6 (06-27-24 @ 07:40)  T(F): 97.9 (06-27-24 @ 07:40), Max: 97.9 (06-27-24 @ 07:40)  HR: --  BP: --  BP(mean): --  RR: 18 (06-27-24 @ 07:40) (18 - 18)  SpO2: 99% (06-27-24 @ 07:40) (99% - 99%)    Orthostatic VS  06-27-24 @ 07:40  Lying BP: --/-- HR: --  Sitting BP: 127/74 HR: 88  Standing BP: 113/68 HR: 95  Site: upper right arm  Mode: electronic  Orthostatic VS  06-26-24 @ 07:44  Lying BP: --/-- HR: --  Sitting BP: 112/77 HR: 96  Standing BP: 103/67 HR: 100  Site: upper right arm  Mode: electronic  
Vital Signs Last 24 Hrs  T(C): 36.4 (06-23-24 @ 07:55), Max: 36.4 (06-23-24 @ 07:55)  T(F): 97.5 (06-23-24 @ 07:55), Max: 97.5 (06-23-24 @ 07:55)  HR: --  BP: --  BP(mean): --  RR: 18 (06-23-24 @ 07:55) (18 - 18)  SpO2: 100% (06-23-24 @ 07:55) (100% - 100%)    Orthostatic VS  06-23-24 @ 07:55  Lying BP: --/-- HR: --  Sitting BP: 125/90 HR: 86  Standing BP: 125/79 HR: 92  Site: upper right arm  Mode: electronic  Orthostatic VS  06-22-24 @ 06:54  Lying BP: --/-- HR: --  Sitting BP: 134/89 HR: 97  Standing BP: 130/81 HR: 93  Site: upper right arm  Mode: electronic  Orthostatic VS  06-22-24 @ 02:29  Lying BP: --/-- HR: --  Sitting BP: 150/90 HR: 91  Standing BP: 158/98 HR: 94  Site: upper left arm  Mode: --  
Vital Signs Last 24 Hrs  T(C): 36.4 (06-26-24 @ 07:44), Max: 36.4 (06-26-24 @ 07:44)  T(F): 97.5 (06-26-24 @ 07:44), Max: 97.5 (06-26-24 @ 07:44)  HR: --  BP: --  BP(mean): --  RR: 16 (06-26-24 @ 07:44) (16 - 16)  SpO2: 98% (06-26-24 @ 07:44) (98% - 98%)    Orthostatic VS  06-26-24 @ 07:44  Lying BP: --/-- HR: --  Sitting BP: 112/77 HR: 96  Standing BP: 103/67 HR: 100  Site: upper right arm  Mode: electronic  Orthostatic VS  06-25-24 @ 07:34  Lying BP: --/-- HR: --  Sitting BP: 117/74 HR: 100  Standing BP: 107/82 HR: 100  Site: upper right arm  Mode: electronic  
Vital Signs Last 24 Hrs  T(C): 36.6 (07-05-24 @ 07:36), Max: 36.6 (07-05-24 @ 07:36)  T(F): 97.8 (07-05-24 @ 07:36), Max: 97.8 (07-05-24 @ 07:36)  HR: --  BP: --  BP(mean): --  RR: 16 (07-05-24 @ 07:36) (16 - 16)  SpO2: 100% (07-05-24 @ 07:36) (100% - 100%)    Orthostatic VS  07-05-24 @ 07:36  Lying BP: --/-- HR: --  Sitting BP: 128/86 HR: 96  Standing BP: 104/73 HR: 96  Site: upper right arm  Mode: electronic  Orthostatic VS  07-04-24 @ 07:25  Lying BP: --/-- HR: --  Sitting BP: 120/86 HR: 99  Standing BP: 114/71 HR: 103  Site: upper right arm  Mode: electronic  
Vital Signs Last 24 Hrs  T(C): 36.5 (06-24-24 @ 07:19), Max: 36.5 (06-24-24 @ 07:19)  T(F): 97.7 (06-24-24 @ 07:19), Max: 97.7 (06-24-24 @ 07:19)  HR: --  BP: --  BP(mean): --  RR: 16 (06-24-24 @ 07:19) (16 - 16)  SpO2: 98% (06-24-24 @ 07:19) (98% - 98%)    Orthostatic VS  06-24-24 @ 07:19  Lying BP: --/-- HR: --  Sitting BP: 103/88 HR: 100  Standing BP: 124/61 HR: 102  Site: upper right arm  Mode: electronic  Orthostatic VS  06-23-24 @ 07:55  Lying BP: --/-- HR: --  Sitting BP: 125/90 HR: 86  Standing BP: 125/79 HR: 92  Site: upper right arm  Mode: electronic  
Vital Signs Last 24 Hrs  T(C): --  T(F): --  HR: --  BP: --  BP(mean): --  RR: --  SpO2: --    Orthostatic VS  07-01-24 @ 07:28  Lying BP: --/-- HR: --  Sitting BP: 121/80 HR: 102  Standing BP: 100/71 HR: 102  Site: upper right arm  Mode: electronic  
Vital Signs Last 24 Hrs  T(C): 36.4 (07-03-24 @ 07:02), Max: 36.4 (07-03-24 @ 07:02)  T(F): 97.6 (07-03-24 @ 07:02), Max: 97.6 (07-03-24 @ 07:02)  HR: --  BP: --  BP(mean): --  RR: 16 (07-03-24 @ 07:02) (16 - 16)  SpO2: 100% (07-03-24 @ 07:02) (100% - 100%)    Orthostatic VS  07-03-24 @ 07:02  Lying BP: --/-- HR: --  Sitting BP: 124/86 HR: 90  Standing BP: 113/78 HR: 95  Site: upper right arm  Mode: electronic

## 2024-07-08 NOTE — BH INPATIENT PSYCHIATRY DISCHARGE NOTE - NSDCMRMEDTOKEN_GEN_ALL_CORE_FT
famotidine 20 mg oral tablet: 1 tab(s) orally once a day  hydroCHLOROthiazide 12.5 mg oral capsule: 1 cap(s) orally once a day  Lidocare Pain Relief Patch 4% topical film: Apply topically to affected area once a day  lisinopril 10 mg oral tablet: 1 tab(s) orally once a day  risperiDONE 1 mg oral tablet: 1 tab(s) orally once a day  risperiDONE 2 mg oral tablet: 1 tab(s) orally once a day (at bedtime)  traZODone 50 mg oral tablet: 1 tab(s) orally once a day (at bedtime)  Wellbutrin  mg/24 hours oral tablet, extended release: 1 tab(s) orally once a day  zolpidem 5 mg oral tablet: 1 tab(s) orally once a day (at bedtime) MDD: 5 mg/day

## 2024-07-08 NOTE — BH INPATIENT PSYCHIATRY PROGRESS NOTE - NSTXDCOTHRGOAL_PSY_ALL_CORE
Patient will be referred to the appropriate level of outpatient treatment and have appointments within 3-5 days of discharge.  Pt reports no prior hx with outpt treatment/support, Sw to explore aftercare options for referral  Patient will be discharged to safe housing either back home (Home NEW UPDATED Address: [address] [facility] [address]) *** old address is still used by insurance.  ([address], [place] [address]) with family support  or DSS emergency housing.  [facility]   will explore need for dual diagnosis tx with appointments if needed.
Patient will be referred to the appropriate level of outpatient treatment and have appointments within 3-5 days of discharge.  Pt reports no prior hx with outpt treatment/support, Sw to explore aftercare options for referral  Patient will be discharged to safe housing either back home (Home NEW UPDATED Address: [address] [facility] [address]) *** old address is still used by insurance.  ([address], [place] [address]) with family support  or DSS emergency housing.  [facility]   will explore need for dual diagnosis tx with appointments if needed.
Patient will be referred to the appropriate level of outpatient treatment and have appointments within 3-5 days of discharge.  Pt reports no prior hx with outpt treatment/support, Sw to explore aftercare options for referral  Patient will be discharged to safe housing either back home (Home NEW UPDATED Address: [address] [facility] [address]) *** old address is still used by insurance.  ([address], [place] [address]) with family support  or DSS emergency housing.  [facility]   will explore need for dual diagnosis tx with appointments if needed.
Patient will be referred to the appropriate level of outpatient treatment and have appointments within 3-5 days of discharge.  Pt reports no prior hx with outpt treatment/support, Sw to explore aftercare options for referral  Patient will be discharged to safe housing either back home (Home NEW UPDATED Address: [address] [facility] [address]) *** old address is still used by insurance.  ([address], [place] [address]) with family support  or DSS emergency housing.  [facility]   will explore need for dual diagnosis tx with appointments if needed.
Patient will be referred to the appropriate level of outpatient treatment and have appointments within 3-5 days of discharge.  Pt reports no prior hx with outpt treatment/support, Sw to explore aftercare options for referral  Patient will be discharged to safe housing either back home (Home NEW UPDATED Address: [address] [facility] [address]) *** old address is still used by insurance.  ([address], [place] [address]) with family support  or DSS emergency housing.  [facility]   will explore need for dual diagnosis tx with appointments if needed.
Patient will be referred to the appropriate level of outpatient treatment and have appointments within 3-5 days of discharge.  Pt reports no prior hx with outpt treatment/support, Sw to explore aftercare options for referral  Patient will be discharged to safe housing either back home (Home NEW UPDATED Address: [address] [facility] [address]) *** old address is still used by insurance.  ([address], [place] [address]) with family support  or DSS emergency housing.  [facility]   will explore need for dual diagnosis tx with appointments if needed.
Patient will be referred to the appropriate level of outpatient treatment and have appointments within 3-5 days of discharge.  Pt reports no prior hx with outpt treatment/support, Sw to explore aftercare options for referral  Patient will be discharged to safe housing either back home (Home NEW UPDATED Address: [address] [facility] [address]) *** old address is still used by insurance.  ([address], [place] [address]) with family support  or DSS emergency housing.  [facility]   will explore need for dual diagnosis tx with appointments if needed.
Patient will be referred to the appropriate level of outpatient treatment and have appointments within 3-5 days of discharge.  Pt reports no prior hx with outpt treatment/support, Sw to explore aftercare options for referral  Patient will be discharged to safe housing either back home (Home NEW UPDATED Address: [address] [facility] [address]) *** old address is still used by insurance.  ([address], [place] [address]) with family support  or DSS emergency housing.  [facility]   will explore need for dual diagnosis tx with appointments if needed.
Patient will be referred to the appropriate level of outpatient treatment and have appointments within 3-5 days of discharge.  Pt reports no prior hx with outpt treatment/support, Sw to explore aftercare options for referral  Patient will be discharged to safe housing either back home (Home NEW UPDATED Address: [address] [facility] [address]) *** old address is still used by insurance.  ([address], [place] [address]) with family support  or DSS emergency housing.  [facility]   will explore need for dual diagnosis tx with appointments if needed.
Patient will be referred to the appropriate level of outpatient treatment and have appointments within 3-5 days of discharge.  Pt reports no prior hx with outpt treatment/support, Sw to explore aftercare options for referral  Patient will be discharged to safe housing either back home (Home NEW UPDATED Address: [address] [facility] [address]) *** old address is still used by insurance.  ([address], [place] [address]) with family support  or DSS emergency housing.  Benefits in place- [facility]   will explore need for dual diagnosis tx with appointments if needed.
Patient will be referred to the appropriate level of outpatient treatment and have appointments within 3-5 days of discharge.  Pt reports no prior hx with outpt treatment/support, Sw to explore aftercare options for referral  Patient will be discharged to safe housing either back home (Home NEW UPDATED Address: [address] [facility] [address]) *** old address is still used by insurance.  ([address], [place] [address]) with family support  or DSS emergency housing.  [facility]   will explore need for dual diagnosis tx with appointments if needed.

## 2024-07-08 NOTE — BH INPATIENT PSYCHIATRY PROGRESS NOTE - NSBHFUPINTERVALHXFT_PSY_A_CORE
07/08/2024: Patient was seen today AM, chart reviewed and discussed in team. Overall doing very well on her meds, mood in control, not depressed, denied A/H now, was concerned for throwing up for few days, now has stabilized and has fair to good sleep on Ambien/Trazodone combination. To continue meds as ordered for stability/safety. Not S/H and meds to be sent to pharmacy as requested. Her VSS are WNL, and she seems more optimistic to go now.

## 2024-07-08 NOTE — BH INPATIENT PSYCHIATRY PROGRESS NOTE - NSTXANXDATETRGT_PSY_ALL_CORE
14-Jul-2024
30-Jun-2024
07-Jul-2024
07-Jul-2024
30-Jun-2024
30-Jun-2024
07-Jul-2024
07-Jul-2024

## 2024-07-08 NOTE — BH INPATIENT PSYCHIATRY PROGRESS NOTE - NSBHATTESTBILLBASEDTIME_PSY_A_CORE
Time based billing

## 2024-07-08 NOTE — BH INPATIENT PSYCHIATRY PROGRESS NOTE - NSICDXBHTERTIARYDX_PSY_ALL_CORE
R/O Bipolar affective   F31.9  

## 2024-07-08 NOTE — BH INPATIENT PSYCHIATRY PROGRESS NOTE - NSDCCRITERIA_PSY_ALL_CORE
pt with return of sleep quality , pt to deny any hallucination 

## 2024-07-08 NOTE — BH INPATIENT PSYCHIATRY PROGRESS NOTE - NSBHMETABOLIC_PSY_ALL_CORE_FT
BMI: BMI (kg/m2): 32.8 (06-22-24 @ 02:29)  HbA1c: A1C with Estimated Average Glucose Result: 5.8 % (06-22-24 @ 07:56)    Glucose:   BP: --Vital Signs Last 24 Hrs  T(C): --  T(F): --  HR: --  BP: --  BP(mean): --  RR: --  SpO2: --    Orthostatic VS  07-01-24 @ 07:28  Lying BP: --/-- HR: --  Sitting BP: 121/80 HR: 102  Standing BP: 100/71 HR: 102  Site: upper right arm  Mode: electronic    Lipid Panel: Date/Time: 06-22-24 @ 07:56  Cholesterol, Serum: 205  LDL Cholesterol Calculated: 128  HDL Cholesterol, Serum: 57  Total Cholesterol/HDL Ration Measurement: --  Triglycerides, Serum: 109  
BMI: BMI (kg/m2): 32.8 (06-22-24 @ 02:29)  HbA1c: A1C with Estimated Average Glucose Result: 5.8 % (06-22-24 @ 07:56)    Glucose:   BP: 136/93 (06-22-24 @ 01:48) (127/84 - 148/100)Vital Signs Last 24 Hrs  T(C): 36.4 (06-23-24 @ 07:55), Max: 36.4 (06-23-24 @ 07:55)  T(F): 97.5 (06-23-24 @ 07:55), Max: 97.5 (06-23-24 @ 07:55)  HR: --  BP: --  BP(mean): --  RR: 18 (06-23-24 @ 07:55) (18 - 18)  SpO2: 100% (06-23-24 @ 07:55) (100% - 100%)    Orthostatic VS  06-23-24 @ 07:55  Lying BP: --/-- HR: --  Sitting BP: 125/90 HR: 86  Standing BP: 125/79 HR: 92  Site: upper right arm  Mode: electronic  Orthostatic VS  06-22-24 @ 06:54  Lying BP: --/-- HR: --  Sitting BP: 134/89 HR: 97  Standing BP: 130/81 HR: 93  Site: upper right arm  Mode: electronic  Orthostatic VS  06-22-24 @ 02:29  Lying BP: --/-- HR: --  Sitting BP: 150/90 HR: 91  Standing BP: 158/98 HR: 94  Site: upper left arm  Mode: --    Lipid Panel: Date/Time: 06-22-24 @ 07:56  Cholesterol, Serum: 205  LDL Cholesterol Calculated: 128  HDL Cholesterol, Serum: 57  Total Cholesterol/HDL Ration Measurement: --  Triglycerides, Serum: 109  
BMI: BMI (kg/m2): 32.8 (06-22-24 @ 02:29)  HbA1c: A1C with Estimated Average Glucose Result: 5.8 % (06-22-24 @ 07:56)    Glucose:   BP: --Vital Signs Last 24 Hrs  T(C): 36.5 (06-25-24 @ 07:34), Max: 36.5 (06-25-24 @ 07:34)  T(F): 97.7 (06-25-24 @ 07:34), Max: 97.7 (06-25-24 @ 07:34)  HR: --  BP: --  BP(mean): --  RR: 16 (06-25-24 @ 07:34) (16 - 16)  SpO2: 100% (06-25-24 @ 07:34) (100% - 100%)    Orthostatic VS  06-25-24 @ 07:34  Lying BP: --/-- HR: --  Sitting BP: 117/74 HR: 100  Standing BP: 107/82 HR: 100  Site: upper right arm  Mode: electronic  Orthostatic VS  06-24-24 @ 07:19  Lying BP: --/-- HR: --  Sitting BP: 103/88 HR: 100  Standing BP: 124/61 HR: 102  Site: upper right arm  Mode: electronic    Lipid Panel: Date/Time: 06-22-24 @ 07:56  Cholesterol, Serum: 205  LDL Cholesterol Calculated: 128  HDL Cholesterol, Serum: 57  Total Cholesterol/HDL Ration Measurement: --  Triglycerides, Serum: 109  
BMI: BMI (kg/m2): 32.8 (06-22-24 @ 02:29)  HbA1c: A1C with Estimated Average Glucose Result: 5.8 % (06-22-24 @ 07:56)    Glucose:   BP: --Vital Signs Last 24 Hrs  T(C): 36.4 (06-28-24 @ 07:00), Max: 36.4 (06-28-24 @ 07:00)  T(F): 97.5 (06-28-24 @ 07:00), Max: 97.5 (06-28-24 @ 07:00)  HR: --  BP: --  BP(mean): --  RR: 16 (06-28-24 @ 07:00) (16 - 16)  SpO2: 100% (06-28-24 @ 07:00) (100% - 100%)    Orthostatic VS  06-28-24 @ 07:00  Lying BP: --/-- HR: --  Sitting BP: 98/78 HR: 89  Standing BP: 90/67 HR: 100  Site: upper right arm  Mode: electronic  Orthostatic VS  06-27-24 @ 07:40  Lying BP: --/-- HR: --  Sitting BP: 127/74 HR: 88  Standing BP: 113/68 HR: 95  Site: upper right arm  Mode: electronic    Lipid Panel: Date/Time: 06-22-24 @ 07:56  Cholesterol, Serum: 205  LDL Cholesterol Calculated: 128  HDL Cholesterol, Serum: 57  Total Cholesterol/HDL Ration Measurement: --  Triglycerides, Serum: 109  
BMI: BMI (kg/m2): 32.8 (06-22-24 @ 02:29)  HbA1c: A1C with Estimated Average Glucose Result: 5.8 % (06-22-24 @ 07:56)    Glucose:   BP: --Vital Signs Last 24 Hrs  T(C): 36.4 (07-03-24 @ 07:02), Max: 36.4 (07-03-24 @ 07:02)  T(F): 97.6 (07-03-24 @ 07:02), Max: 97.6 (07-03-24 @ 07:02)  HR: --  BP: --  BP(mean): --  RR: 16 (07-03-24 @ 07:02) (16 - 16)  SpO2: 100% (07-03-24 @ 07:02) (100% - 100%)    Orthostatic VS  07-03-24 @ 07:02  Lying BP: --/-- HR: --  Sitting BP: 124/86 HR: 90  Standing BP: 113/78 HR: 95  Site: upper right arm  Mode: electronic    Lipid Panel: Date/Time: 06-22-24 @ 07:56  Cholesterol, Serum: 205  LDL Cholesterol Calculated: 128  HDL Cholesterol, Serum: 57  Total Cholesterol/HDL Ration Measurement: --  Triglycerides, Serum: 109  
BMI: BMI (kg/m2): 32.8 (06-22-24 @ 02:29)  HbA1c: A1C with Estimated Average Glucose Result: 5.8 % (06-22-24 @ 07:56)    Glucose:   BP: --Vital Signs Last 24 Hrs  T(C): 36.5 (07-08-24 @ 07:46), Max: 36.5 (07-08-24 @ 07:46)  T(F): 97.7 (07-08-24 @ 07:46), Max: 97.7 (07-08-24 @ 07:46)  HR: --  BP: --  BP(mean): --  RR: 16 (07-08-24 @ 07:46) (16 - 16)  SpO2: 100% (07-08-24 @ 07:46) (100% - 100%)    Orthostatic VS  07-08-24 @ 07:46  Lying BP: --/-- HR: --  Sitting BP: 97/63 HR: 95  Standing BP: 91/59 HR: 100  Site: upper right arm  Mode: electronic  Orthostatic VS  07-07-24 @ 07:03  Lying BP: --/-- HR: --  Sitting BP: 113/77 HR: 86  Standing BP: 107/72 HR: 86  Site: upper right arm  Mode: electronic    Lipid Panel: Date/Time: 06-22-24 @ 07:56  Cholesterol, Serum: 205  LDL Cholesterol Calculated: 128  HDL Cholesterol, Serum: 57  Total Cholesterol/HDL Ration Measurement: --  Triglycerides, Serum: 109  
BMI: BMI (kg/m2): 32.8 (06-22-24 @ 02:29)  HbA1c: A1C with Estimated Average Glucose Result: 5.8 % (06-22-24 @ 07:56)    Glucose:   BP: --Vital Signs Last 24 Hrs  T(C): 36.6 (07-05-24 @ 07:36), Max: 36.6 (07-05-24 @ 07:36)  T(F): 97.8 (07-05-24 @ 07:36), Max: 97.8 (07-05-24 @ 07:36)  HR: --  BP: --  BP(mean): --  RR: 16 (07-05-24 @ 07:36) (16 - 16)  SpO2: 100% (07-05-24 @ 07:36) (100% - 100%)    Orthostatic VS  07-05-24 @ 07:36  Lying BP: --/-- HR: --  Sitting BP: 128/86 HR: 96  Standing BP: 104/73 HR: 96  Site: upper right arm  Mode: electronic  Orthostatic VS  07-04-24 @ 07:25  Lying BP: --/-- HR: --  Sitting BP: 120/86 HR: 99  Standing BP: 114/71 HR: 103  Site: upper right arm  Mode: electronic    Lipid Panel: Date/Time: 06-22-24 @ 07:56  Cholesterol, Serum: 205  LDL Cholesterol Calculated: 128  HDL Cholesterol, Serum: 57  Total Cholesterol/HDL Ration Measurement: --  Triglycerides, Serum: 109  
BMI: BMI (kg/m2): 32.8 (06-22-24 @ 02:29)  HbA1c: A1C with Estimated Average Glucose Result: 5.8 % (06-22-24 @ 07:56)    Glucose:   BP: --Vital Signs Last 24 Hrs  T(C): 36.4 (06-26-24 @ 07:44), Max: 36.4 (06-26-24 @ 07:44)  T(F): 97.5 (06-26-24 @ 07:44), Max: 97.5 (06-26-24 @ 07:44)  HR: --  BP: --  BP(mean): --  RR: 16 (06-26-24 @ 07:44) (16 - 16)  SpO2: 98% (06-26-24 @ 07:44) (98% - 98%)    Orthostatic VS  06-26-24 @ 07:44  Lying BP: --/-- HR: --  Sitting BP: 112/77 HR: 96  Standing BP: 103/67 HR: 100  Site: upper right arm  Mode: electronic  Orthostatic VS  06-25-24 @ 07:34  Lying BP: --/-- HR: --  Sitting BP: 117/74 HR: 100  Standing BP: 107/82 HR: 100  Site: upper right arm  Mode: electronic    Lipid Panel: Date/Time: 06-22-24 @ 07:56  Cholesterol, Serum: 205  LDL Cholesterol Calculated: 128  HDL Cholesterol, Serum: 57  Total Cholesterol/HDL Ration Measurement: --  Triglycerides, Serum: 109  
BMI: BMI (kg/m2): 32.8 (06-22-24 @ 02:29)  HbA1c: A1C with Estimated Average Glucose Result: 5.8 % (06-22-24 @ 07:56)    Glucose:   BP: --Vital Signs Last 24 Hrs  T(C): 36.6 (06-27-24 @ 07:40), Max: 36.6 (06-27-24 @ 07:40)  T(F): 97.9 (06-27-24 @ 07:40), Max: 97.9 (06-27-24 @ 07:40)  HR: --  BP: --  BP(mean): --  RR: 18 (06-27-24 @ 07:40) (18 - 18)  SpO2: 99% (06-27-24 @ 07:40) (99% - 99%)    Orthostatic VS  06-27-24 @ 07:40  Lying BP: --/-- HR: --  Sitting BP: 127/74 HR: 88  Standing BP: 113/68 HR: 95  Site: upper right arm  Mode: electronic  Orthostatic VS  06-26-24 @ 07:44  Lying BP: --/-- HR: --  Sitting BP: 112/77 HR: 96  Standing BP: 103/67 HR: 100  Site: upper right arm  Mode: electronic    Lipid Panel: Date/Time: 06-22-24 @ 07:56  Cholesterol, Serum: 205  LDL Cholesterol Calculated: 128  HDL Cholesterol, Serum: 57  Total Cholesterol/HDL Ration Measurement: --  Triglycerides, Serum: 109  
BMI: BMI (kg/m2): 32.8 (06-22-24 @ 02:29)  HbA1c: A1C with Estimated Average Glucose Result: 5.8 % (06-22-24 @ 07:56)    Glucose:   BP: 136/93 (06-22-24 @ 01:48) (127/84 - 148/100)Vital Signs Last 24 Hrs  T(C): 36.5 (06-24-24 @ 07:19), Max: 36.5 (06-24-24 @ 07:19)  T(F): 97.7 (06-24-24 @ 07:19), Max: 97.7 (06-24-24 @ 07:19)  HR: --  BP: --  BP(mean): --  RR: 16 (06-24-24 @ 07:19) (16 - 16)  SpO2: 98% (06-24-24 @ 07:19) (98% - 98%)    Orthostatic VS  06-24-24 @ 07:19  Lying BP: --/-- HR: --  Sitting BP: 103/88 HR: 100  Standing BP: 124/61 HR: 102  Site: upper right arm  Mode: electronic  Orthostatic VS  06-23-24 @ 07:55  Lying BP: --/-- HR: --  Sitting BP: 125/90 HR: 86  Standing BP: 125/79 HR: 92  Site: upper right arm  Mode: electronic    Lipid Panel: Date/Time: 06-22-24 @ 07:56  Cholesterol, Serum: 205  LDL Cholesterol Calculated: 128  HDL Cholesterol, Serum: 57  Total Cholesterol/HDL Ration Measurement: --  Triglycerides, Serum: 109  
BMI: BMI (kg/m2): 32.8 (06-22-24 @ 02:29)  HbA1c: A1C with Estimated Average Glucose Result: 5.8 % (06-22-24 @ 07:56)    Glucose:   BP: --Vital Signs Last 24 Hrs  T(C): 36.6 (07-01-24 @ 07:28), Max: 36.6 (07-01-24 @ 07:28)  T(F): 97.8 (07-01-24 @ 07:28), Max: 97.8 (07-01-24 @ 07:28)  HR: --  BP: --  BP(mean): --  RR: 16 (07-01-24 @ 07:28) (16 - 16)  SpO2: 98% (07-01-24 @ 07:28) (98% - 98%)    Orthostatic VS  07-01-24 @ 07:28  Lying BP: --/-- HR: --  Sitting BP: 121/80 HR: 102  Standing BP: 100/71 HR: 102  Site: upper right arm  Mode: electronic  Orthostatic VS  06-30-24 @ 07:14  Lying BP: --/-- HR: --  Sitting BP: 125/75 HR: 89  Standing BP: 107/81 HR: 97  Site: upper right arm  Mode: electronic    Lipid Panel: Date/Time: 06-22-24 @ 07:56  Cholesterol, Serum: 205  LDL Cholesterol Calculated: 128  HDL Cholesterol, Serum: 57  Total Cholesterol/HDL Ration Measurement: --  Triglycerides, Serum: 109

## 2024-07-08 NOTE — BH INPATIENT PSYCHIATRY PROGRESS NOTE - NSBHATTESTBILLING_PSY_A_CORE
Billing in another system
43072-Ooheaubzsk OBS or IP - moderate complexity OR 35-49 mins
[id number]-Ayoyccmjaf OBS or IP - moderate complexity OR 35-49 mins
42024-Fxzrthewnl OBS or IP - moderate complexity OR 35-49 mins
[id number]-Dpqjddvmdl OBS or IP - moderate complexity OR 35-49 mins
11076-Bkgzootila OBS or IP - moderate complexity OR 35-49 mins
13087-Nlmwwjzubx OBS or IP - moderate complexity OR 35-49 mins
80856-Ncojggumxo OBS or IP - moderate complexity OR 35-49 mins
[id number]-Thsqxfhmgn OBS or IP - moderate complexity OR 35-49 mins
[id number]-Pqqmmvzndb OBS or IP - moderate complexity OR 35-49 mins
[id number]-Ufwjocwqzs OBS or IP - moderate complexity OR 35-49 mins

## 2024-07-08 NOTE — BH INPATIENT PSYCHIATRY PROGRESS NOTE - CURRENT MEDICATION
MEDICATIONS  (STANDING):  buPROPion XL (24-Hour) 150 milliGRAM(s) Oral daily  clonazePAM  Tablet 0.5 milliGRAM(s) Oral two times a day  famotidine    Tablet 20 milliGRAM(s) Oral daily  hydrochlorothiazide 12.5 milliGRAM(s) Oral daily  lidocaine   4% Patch 1 Patch Transdermal daily  lisinopril 10 milliGRAM(s) Oral daily  QUEtiapine 300 milliGRAM(s) Oral <User Schedule>  risperiDONE   Tablet 1 milliGRAM(s) Oral <User Schedule>  risperiDONE   Tablet 1 milliGRAM(s) Oral daily  traZODone 50 milliGRAM(s) Oral <User Schedule>    MEDICATIONS  (PRN):  acetaminophen     Tablet .. 650 milliGRAM(s) Oral every 6 hours PRN Mild Pain (1 - 3), Moderate Pain (4 - 6)  
MEDICATIONS  (STANDING):  buPROPion XL (24-Hour) 150 milliGRAM(s) Oral daily  famotidine    Tablet 20 milliGRAM(s) Oral daily  hydrochlorothiazide 12.5 milliGRAM(s) Oral daily  lidocaine   4% Patch 1 Patch Transdermal daily  lisinopril 10 milliGRAM(s) Oral daily  risperiDONE   Tablet 1 milliGRAM(s) Oral daily  risperiDONE   Tablet 2 milliGRAM(s) Oral <User Schedule>  traZODone 50 milliGRAM(s) Oral <User Schedule>  zolpidem 5 milliGRAM(s) Oral at bedtime    MEDICATIONS  (PRN):  acetaminophen     Tablet .. 650 milliGRAM(s) Oral every 6 hours PRN Mild Pain (1 - 3), Moderate Pain (4 - 6)  ondansetron   Disintegrating Tablet 4 milliGRAM(s) Oral every 8 hours PRN Nausea and/or vomiting  zolpidem 5 milliGRAM(s) Oral at bedtime PRN Insomnia  
MEDICATIONS  (STANDING):  cephalexin 500 milliGRAM(s) Oral every 12 hours  clonazePAM  Tablet 0.5 milliGRAM(s) Oral two times a day  famotidine    Tablet 20 milliGRAM(s) Oral daily  hydrochlorothiazide 12.5 milliGRAM(s) Oral daily  lisinopril 10 milliGRAM(s) Oral daily  QUEtiapine 150 milliGRAM(s) Oral at bedtime    MEDICATIONS  (PRN):  traZODone 75 milliGRAM(s) Oral at bedtime PRN insomnia  
MEDICATIONS  (STANDING):  buPROPion XL (24-Hour) 150 milliGRAM(s) Oral daily  clonazePAM  Tablet 0.5 milliGRAM(s) Oral two times a day  famotidine    Tablet 20 milliGRAM(s) Oral daily  hydrochlorothiazide 12.5 milliGRAM(s) Oral daily  lidocaine   4% Patch 1 Patch Transdermal daily  lisinopril 10 milliGRAM(s) Oral daily  risperiDONE   Tablet 2 milliGRAM(s) Oral <User Schedule>  risperiDONE   Tablet 1 milliGRAM(s) Oral daily  traZODone 50 milliGRAM(s) Oral <User Schedule>  zolpidem 5 milliGRAM(s) Oral at bedtime    MEDICATIONS  (PRN):  acetaminophen     Tablet .. 650 milliGRAM(s) Oral every 6 hours PRN Mild Pain (1 - 3), Moderate Pain (4 - 6)  zolpidem 5 milliGRAM(s) Oral at bedtime PRN Insomnia  
MEDICATIONS  (STANDING):  buPROPion 37.5 milliGRAM(s) Oral daily  cephalexin 500 milliGRAM(s) Oral every 12 hours  clonazePAM  Tablet 0.5 milliGRAM(s) Oral two times a day  famotidine    Tablet 20 milliGRAM(s) Oral daily  hydrochlorothiazide 12.5 milliGRAM(s) Oral daily  lidocaine   4% Patch 1 Patch Transdermal daily  lisinopril 10 milliGRAM(s) Oral daily  QUEtiapine 300 milliGRAM(s) Oral at bedtime  traZODone 50 milliGRAM(s) Oral at bedtime    MEDICATIONS  (PRN):  acetaminophen     Tablet .. 650 milliGRAM(s) Oral every 6 hours PRN Mild Pain (1 - 3), Moderate Pain (4 - 6)  
MEDICATIONS  (STANDING):  clonazePAM  Tablet 0.5 milliGRAM(s) Oral two times a day  famotidine    Tablet 20 milliGRAM(s) Oral daily  hydrochlorothiazide 12.5 milliGRAM(s) Oral daily  lidocaine   4% Patch 1 Patch Transdermal daily  lisinopril 10 milliGRAM(s) Oral daily  QUEtiapine 300 milliGRAM(s) Oral <User Schedule>  risperiDONE   Tablet 1 milliGRAM(s) Oral <User Schedule>  traZODone 50 milliGRAM(s) Oral <User Schedule>    MEDICATIONS  (PRN):  acetaminophen     Tablet .. 650 milliGRAM(s) Oral every 6 hours PRN Mild Pain (1 - 3), Moderate Pain (4 - 6)  
MEDICATIONS  (STANDING):  buPROPion XL (24-Hour) 150 milliGRAM(s) Oral daily  clonazePAM  Tablet 0.5 milliGRAM(s) Oral two times a day  famotidine    Tablet 20 milliGRAM(s) Oral daily  hydrochlorothiazide 12.5 milliGRAM(s) Oral daily  lidocaine   4% Patch 1 Patch Transdermal daily  lisinopril 10 milliGRAM(s) Oral daily  QUEtiapine 200 milliGRAM(s) Oral <User Schedule>  risperiDONE   Tablet 1 milliGRAM(s) Oral <User Schedule>  risperiDONE   Tablet 1 milliGRAM(s) Oral daily  traZODone 50 milliGRAM(s) Oral <User Schedule>    MEDICATIONS  (PRN):  acetaminophen     Tablet .. 650 milliGRAM(s) Oral every 6 hours PRN Mild Pain (1 - 3), Moderate Pain (4 - 6)  
MEDICATIONS  (STANDING):  buPROPion XL (24-Hour) 150 milliGRAM(s) Oral daily  clonazePAM  Tablet 0.5 milliGRAM(s) Oral two times a day  famotidine    Tablet 20 milliGRAM(s) Oral daily  hydrochlorothiazide 12.5 milliGRAM(s) Oral daily  lidocaine   4% Patch 1 Patch Transdermal daily  lisinopril 10 milliGRAM(s) Oral daily  risperiDONE   Tablet 1 milliGRAM(s) Oral daily  risperiDONE   Tablet 2 milliGRAM(s) Oral <User Schedule>  traZODone 50 milliGRAM(s) Oral <User Schedule>  zolpidem 5 milliGRAM(s) Oral at bedtime    MEDICATIONS  (PRN):  acetaminophen     Tablet .. 650 milliGRAM(s) Oral every 6 hours PRN Mild Pain (1 - 3), Moderate Pain (4 - 6)  zolpidem 5 milliGRAM(s) Oral at bedtime PRN Insomnia  
MEDICATIONS  (STANDING):  cephalexin 500 milliGRAM(s) Oral every 12 hours  clonazePAM  Tablet 0.5 milliGRAM(s) Oral two times a day  famotidine    Tablet 20 milliGRAM(s) Oral daily  hydrochlorothiazide 12.5 milliGRAM(s) Oral daily  lidocaine   4% Patch 1 Patch Transdermal daily  lisinopril 10 milliGRAM(s) Oral daily  QUEtiapine 200 milliGRAM(s) Oral at bedtime    MEDICATIONS  (PRN):  traZODone 75 milliGRAM(s) Oral at bedtime PRN insomnia  
MEDICATIONS  (STANDING):  cephalexin 500 milliGRAM(s) Oral every 12 hours  clonazePAM  Tablet 0.5 milliGRAM(s) Oral two times a day  famotidine    Tablet 20 milliGRAM(s) Oral daily  hydrochlorothiazide 12.5 milliGRAM(s) Oral daily  lidocaine   4% Patch 1 Patch Transdermal daily  lisinopril 10 milliGRAM(s) Oral daily  QUEtiapine 150 milliGRAM(s) Oral at bedtime    MEDICATIONS  (PRN):  traZODone 75 milliGRAM(s) Oral at bedtime PRN insomnia  
MEDICATIONS  (STANDING):  buPROPion XL (24-Hour) 150 milliGRAM(s) Oral daily  clonazePAM  Tablet 0.5 milliGRAM(s) Oral two times a day  famotidine    Tablet 20 milliGRAM(s) Oral daily  hydrochlorothiazide 12.5 milliGRAM(s) Oral daily  lidocaine   4% Patch 1 Patch Transdermal daily  lisinopril 10 milliGRAM(s) Oral daily  risperiDONE   Tablet 1 milliGRAM(s) Oral <User Schedule>  risperiDONE   Tablet 1 milliGRAM(s) Oral daily  traZODone 50 milliGRAM(s) Oral <User Schedule>  zolpidem 5 milliGRAM(s) Oral at bedtime    MEDICATIONS  (PRN):  acetaminophen     Tablet .. 650 milliGRAM(s) Oral every 6 hours PRN Mild Pain (1 - 3), Moderate Pain (4 - 6)  zolpidem 5 milliGRAM(s) Oral at bedtime PRN Insomnia

## 2024-07-08 NOTE — BH INPATIENT PSYCHIATRY PROGRESS NOTE - NSBHMSESPEECH_PSY_A_CORE
Normal volume, rate, productivity, spontaneity and articulation
Walk in
Normal volume, rate, productivity, spontaneity and articulation

## 2024-07-08 NOTE — BH INPATIENT PSYCHIATRY PROGRESS NOTE - NSBHFUPINTERVALCCFT_PSY_A_CORE
I am depressed 
I am depressed 
"I slept"
I am depressed 
I am feeling better now "

## 2024-07-08 NOTE — BH INPATIENT PSYCHIATRY PROGRESS NOTE - NSCGIIMPROVESX_PSY_ALL_CORE
3 = Minimally improved - slightly better with little or no clinically meaningful reduction of symptoms.  Represents very little change in basic clinical status, level of care, or functional capacity.
2 = Much improved - notably better with signficant reduction of symptoms; increase in the level of functioning but some symptoms remain
3 = Minimally improved - slightly better with little or no clinically meaningful reduction of symptoms.  Represents very little change in basic clinical status, level of care, or functional capacity.

## 2024-07-08 NOTE — BH INPATIENT PSYCHIATRY PROGRESS NOTE - NSTXCOPEDATETRGT_PSY_ALL_CORE
03-Jul-2024
03-Jul-2024
07-Jul-2024
01-Jul-2024
07-Jul-2024
01-Jul-2024
08-Jul-2024
03-Jul-2024
07-Jul-2024
07-Jul-2024

## 2024-07-08 NOTE — BH INPATIENT PSYCHIATRY PROGRESS NOTE - PRN MEDS
MEDICATIONS  (PRN):  acetaminophen     Tablet .. 650 milliGRAM(s) Oral every 6 hours PRN Mild Pain (1 - 3), Moderate Pain (4 - 6)  zolpidem 5 milliGRAM(s) Oral at bedtime PRN Insomnia  
MEDICATIONS  (PRN):  traZODone 75 milliGRAM(s) Oral at bedtime PRN insomnia  
MEDICATIONS  (PRN):  acetaminophen     Tablet .. 650 milliGRAM(s) Oral every 6 hours PRN Mild Pain (1 - 3), Moderate Pain (4 - 6)  zolpidem 5 milliGRAM(s) Oral at bedtime PRN Insomnia  
MEDICATIONS  (PRN):  traZODone 75 milliGRAM(s) Oral at bedtime PRN insomnia  
MEDICATIONS  (PRN):  acetaminophen     Tablet .. 650 milliGRAM(s) Oral every 6 hours PRN Mild Pain (1 - 3), Moderate Pain (4 - 6)  
MEDICATIONS  (PRN):  acetaminophen     Tablet .. 650 milliGRAM(s) Oral every 6 hours PRN Mild Pain (1 - 3), Moderate Pain (4 - 6)  
MEDICATIONS  (PRN):  acetaminophen     Tablet .. 650 milliGRAM(s) Oral every 6 hours PRN Mild Pain (1 - 3), Moderate Pain (4 - 6)  zolpidem 5 milliGRAM(s) Oral at bedtime PRN Insomnia  
MEDICATIONS  (PRN):  acetaminophen     Tablet .. 650 milliGRAM(s) Oral every 6 hours PRN Mild Pain (1 - 3), Moderate Pain (4 - 6)  
MEDICATIONS  (PRN):  acetaminophen     Tablet .. 650 milliGRAM(s) Oral every 6 hours PRN Mild Pain (1 - 3), Moderate Pain (4 - 6)  ondansetron   Disintegrating Tablet 4 milliGRAM(s) Oral every 8 hours PRN Nausea and/or vomiting  zolpidem 5 milliGRAM(s) Oral at bedtime PRN Insomnia  
MEDICATIONS  (PRN):  acetaminophen     Tablet .. 650 milliGRAM(s) Oral every 6 hours PRN Mild Pain (1 - 3), Moderate Pain (4 - 6)  
MEDICATIONS  (PRN):  traZODone 75 milliGRAM(s) Oral at bedtime PRN insomnia

## 2024-07-08 NOTE — BH INPATIENT PSYCHIATRY PROGRESS NOTE - NSTXANXDATEEST_PSY_ALL_CORE
30-Jun-2024
22-Jun-2024
30-Jun-2024
22-Jun-2024
30-Jun-2024
22-Jun-2024
30-Jun-2024

## 2024-07-08 NOTE — BH INPATIENT PSYCHIATRY PROGRESS NOTE - NSBHMSEPERCEPT_PSY_A_CORE
No abnormalities
Auditory hallucinations/Visual hallucinations/Illusions
No abnormalities
Auditory hallucinations/Visual hallucinations/Illusions

## 2024-07-08 NOTE — BH INPATIENT PSYCHIATRY PROGRESS NOTE - NSTXDCOTHRDATETRGT_PSY_ALL_CORE
29-Jun-2024
08-Jul-2024
09-Jul-2024
05-Jul-2024
05-Jul-2024
09-Jul-2024
29-Jun-2024
09-Jul-2024

## 2024-07-08 NOTE — BH INPATIENT PSYCHIATRY PROGRESS NOTE - NSBHMSEMOOD_PSY_A_CORE
Depressed/Anxious
Depressed/Anxious
Normal
Depressed/Anxious
Depressed/Anxious
Normal
Depressed/Anxious

## 2024-07-08 NOTE — BH INPATIENT PSYCHIATRY PROGRESS NOTE - NSICDXBHSECONDARYDX_PSY_ALL_CORE
Insomnia   G47.00  

## 2024-07-08 NOTE — BH INPATIENT PSYCHIATRY PROGRESS NOTE - NSTXDCOTHRPROGRES_PSY_ALL_CORE
No Change
Improving
No Change

## 2024-07-08 NOTE — BH INPATIENT PSYCHIATRY DISCHARGE NOTE - HOSPITAL COURSE
Patient was admitted Voluntarily from ED at . She denied drug abuse, and had no prior Psychiatric intervention, she was positive for Cannabis which she started to take sometime before admission for anxiety. She endorses feeling depressed, anxious and also had A/H, command in type endorsing her to kill herself. She never tried to commit suicide, instead she came to ED at  for assistance and was admitted first time in a psychiatric unit. She was meds compliant since admission, no behavioral issues noted. She slowly and steadily started to open up and later endorsed that she has A/H telling her to commit suicide. On admission she also mentioned that she has sleep issues and was initially started on Seroquel 50 mg HS with eventual titration to Seroquel 200 mg HS with help with sleep, but not able to get rid of her command A/H, she was later switched to Risperdal 0.5 mg BID with eventual titration to Risperdal 1 mg AM and Risperdal 2 mg HS. Wellbutrin was also started to help elevate her mood as she was depressed. Wellbutrin was later titrated to Wellbutrin  mg daily with good success. She also has sleep issues and has not been sleeping or a long time, she was initially started on Seroquel and Trazodone , and as Seroquel was discontinued and she has no substance abuse issues Ambien 5 mg was given with Trazodone 50 mg to help with sleep. She started to sleep in 2 hrs after taking t Trazodone 50 mg and Ambien 5 mg HS. Overall with all the issues Depression, Psychosis and sleep, she started to improve after meds adjustment and has no A/H now, with adequate sleep and overall improved mood. She has no SI and has no perceptual difficulties.    Medically she had UTI and was given 5-7 days course of Cephalexin, she does not complain of any urinary issues at this time.  She has HTN controlled with HCTZ 12.5 mg and  Lisinopril 10 mg daily  She has Back pain and takes Lidocaine 4 % patch applied transdermally daily when awake.

## 2024-07-08 NOTE — BH INPATIENT PSYCHIATRY PROGRESS NOTE - NSBHMSEAFFQUAL_PSY_A_CORE
Depressed/Anxious
Depressed/Anxious
Euthymic
Depressed/Anxious
Euthymic
Depressed/Anxious

## 2024-07-09 RX ORDER — RISPERIDONE 0.5 MG/1
1 TABLET ORAL
Qty: 30 | Refills: 0
Start: 2024-07-09 | End: 2024-08-07

## 2024-07-09 RX ORDER — TRAZODONE HYDROCHLORIDE 50 MG/1
1 TABLET, FILM COATED ORAL
Qty: 30 | Refills: 0
Start: 2024-07-09 | End: 2024-08-07

## 2024-07-11 ENCOUNTER — EMERGENCY (EMERGENCY)
Facility: HOSPITAL | Age: 49
LOS: 0 days | Discharge: HOME | End: 2024-07-11
Attending: EMERGENCY MEDICINE
Payer: MEDICAID

## 2024-07-11 VITALS
RESPIRATION RATE: 18 BRPM | SYSTOLIC BLOOD PRESSURE: 135 MMHG | HEART RATE: 83 BPM | TEMPERATURE: 96 F | OXYGEN SATURATION: 99 % | DIASTOLIC BLOOD PRESSURE: 95 MMHG

## 2024-07-11 VITALS — WEIGHT: 160.06 LBS | HEIGHT: 60 IN

## 2024-07-11 DIAGNOSIS — R11.2 NAUSEA WITH VOMITING, UNSPECIFIED: ICD-10-CM

## 2024-07-11 DIAGNOSIS — R11.10 VOMITING, UNSPECIFIED: ICD-10-CM

## 2024-07-11 DIAGNOSIS — Z98.89 OTHER SPECIFIED POSTPROCEDURAL STATES: Chronic | ICD-10-CM

## 2024-07-11 PROCEDURE — 99283 EMERGENCY DEPT VISIT LOW MDM: CPT

## 2024-07-11 PROCEDURE — 99284 EMERGENCY DEPT VISIT MOD MDM: CPT | Mod: 25

## 2024-07-11 RX ORDER — ONDANSETRON HYDROCHLORIDE 2 MG/ML
4 INJECTION INTRAMUSCULAR; INTRAVENOUS ONCE
Refills: 0 | Status: COMPLETED | OUTPATIENT
Start: 2024-07-11 | End: 2024-07-11

## 2024-07-11 RX ORDER — ONDANSETRON HYDROCHLORIDE 2 MG/ML
1 INJECTION INTRAMUSCULAR; INTRAVENOUS
Qty: 9 | Refills: 0
Start: 2024-07-11 | End: 2024-07-13

## 2024-07-11 RX ADMIN — ONDANSETRON HYDROCHLORIDE 4 MILLIGRAM(S): 2 INJECTION INTRAMUSCULAR; INTRAVENOUS at 02:52

## 2024-07-11 RX ADMIN — Medication 5 MILLIGRAM(S): at 03:31

## 2024-07-12 DIAGNOSIS — F29 UNSPECIFIED PSYCHOSIS NOT DUE TO A SUBSTANCE OR KNOWN PHYSIOLOGICAL CONDITION: ICD-10-CM

## 2024-07-12 DIAGNOSIS — R45.851 SUICIDAL IDEATIONS: ICD-10-CM

## 2024-07-12 DIAGNOSIS — F32.9 MAJOR DEPRESSIVE DISORDER, SINGLE EPISODE, UNSPECIFIED: ICD-10-CM

## 2024-07-12 DIAGNOSIS — N39.0 URINARY TRACT INFECTION, SITE NOT SPECIFIED: ICD-10-CM

## 2024-07-12 DIAGNOSIS — G47.00 INSOMNIA, UNSPECIFIED: ICD-10-CM

## 2024-07-12 DIAGNOSIS — I10 ESSENTIAL (PRIMARY) HYPERTENSION: ICD-10-CM

## 2024-07-15 ENCOUNTER — NON-APPOINTMENT (OUTPATIENT)
Age: 49
End: 2024-07-15

## 2024-07-16 NOTE — BH SAFETY PLAN - CRISIS CLINICIAN NAME 1
Chief Complaint   Patient presents with    Prenatal Care     [id number]       
[facility]

## 2024-09-11 ENCOUNTER — RX RENEWAL (OUTPATIENT)
Age: 49
End: 2024-09-11

## 2025-02-28 NOTE — BH INPATIENT PSYCHIATRY PROGRESS NOTE - NSBHATTESTTYPEVISIT_PSY_A_CORE
oriented to person, place and time , normal sensation , short and long term memory intact
Attending Only
Attending Only
normal...
Attending Only

## 2025-03-10 NOTE — BH SOCIAL WORK INITIAL PSYCHOSOCIAL EVALUATION - NSHIGHRISKBEHFT_PSY_ALL_CORE
PT completed evaluation on 3/10/25.  Frequency:   1 week 1, 2 week 3,  Effective 3/10/25.  Please assist with authorization as needed.      
Increased depression and anxiety, worsening insomnia, depressed mood, fatigue, anger, low frustration tolerance, racing thoughts and self harm by hitting her head with her hands for the past month.  Pt reports thoughts of death/ passive SI, divorce, using MJ, no current outpt support/therapy    *** Pt reports she changed her work schedule over past yr from 7am-4pm to night shift 10pm -7am and has not been able to sleep / resting is worse with schedule change.